# Patient Record
Sex: FEMALE | Race: WHITE | Employment: FULL TIME | URBAN - METROPOLITAN AREA
[De-identification: names, ages, dates, MRNs, and addresses within clinical notes are randomized per-mention and may not be internally consistent; named-entity substitution may affect disease eponyms.]

---

## 2017-01-04 ENCOUNTER — OFFICE VISIT (OUTPATIENT)
Dept: FAMILY MEDICINE CLINIC | Age: 32
End: 2017-01-04

## 2017-01-04 VITALS
RESPIRATION RATE: 12 BRPM | SYSTOLIC BLOOD PRESSURE: 112 MMHG | TEMPERATURE: 98.5 F | HEART RATE: 104 BPM | WEIGHT: 166.4 LBS | BODY MASS INDEX: 25.22 KG/M2 | DIASTOLIC BLOOD PRESSURE: 80 MMHG

## 2017-01-04 DIAGNOSIS — F41.8 DEPRESSION WITH ANXIETY: ICD-10-CM

## 2017-01-04 DIAGNOSIS — F33.1 DEPRESSION, MAJOR, RECURRENT, MODERATE (HCC): Primary | ICD-10-CM

## 2017-01-04 PROCEDURE — 99214 OFFICE O/P EST MOD 30 MIN: CPT | Performed by: FAMILY MEDICINE

## 2017-01-04 RX ORDER — VILAZODONE HYDROCHLORIDE 20 MG/1
20 TABLET ORAL DAILY
Qty: 30 TABLET | Refills: 5 | Status: SHIPPED | OUTPATIENT
Start: 2017-01-04 | End: 2017-01-19 | Stop reason: SDUPTHER

## 2017-01-04 RX ORDER — ZOLPIDEM TARTRATE 10 MG/1
5-10 TABLET ORAL NIGHTLY PRN
Qty: 15 TABLET | Refills: 0 | Status: SHIPPED | OUTPATIENT
Start: 2017-01-04 | End: 2017-02-03

## 2017-01-10 ENCOUNTER — PATIENT MESSAGE (OUTPATIENT)
Dept: FAMILY MEDICINE CLINIC | Age: 32
End: 2017-01-10

## 2017-01-10 DIAGNOSIS — F33.1 DEPRESSION, MAJOR, RECURRENT, MODERATE (HCC): ICD-10-CM

## 2017-01-10 DIAGNOSIS — F41.8 DEPRESSION WITH ANXIETY: ICD-10-CM

## 2017-01-19 RX ORDER — VILAZODONE HYDROCHLORIDE 40 MG/1
40 TABLET ORAL DAILY
Qty: 30 TABLET | Refills: 2 | Status: SHIPPED | OUTPATIENT
Start: 2017-01-19 | End: 2017-04-05 | Stop reason: SDUPTHER

## 2017-04-05 ENCOUNTER — OFFICE VISIT (OUTPATIENT)
Dept: FAMILY MEDICINE CLINIC | Age: 32
End: 2017-04-05

## 2017-04-05 VITALS
TEMPERATURE: 98.5 F | RESPIRATION RATE: 12 BRPM | SYSTOLIC BLOOD PRESSURE: 127 MMHG | HEART RATE: 120 BPM | OXYGEN SATURATION: 97 % | WEIGHT: 168.6 LBS | DIASTOLIC BLOOD PRESSURE: 77 MMHG | BODY MASS INDEX: 25.55 KG/M2

## 2017-04-05 DIAGNOSIS — F41.8 DEPRESSION WITH ANXIETY: ICD-10-CM

## 2017-04-05 DIAGNOSIS — F33.1 DEPRESSION, MAJOR, RECURRENT, MODERATE (HCC): ICD-10-CM

## 2017-04-05 DIAGNOSIS — R35.0 URINARY FREQUENCY: Primary | ICD-10-CM

## 2017-04-05 DIAGNOSIS — S69.92XA FINGER INJURY, LEFT, INITIAL ENCOUNTER: ICD-10-CM

## 2017-04-05 PROCEDURE — 99214 OFFICE O/P EST MOD 30 MIN: CPT | Performed by: FAMILY MEDICINE

## 2017-04-05 RX ORDER — ZOLPIDEM TARTRATE 10 MG/1
TABLET ORAL NIGHTLY PRN
Status: CANCELLED | OUTPATIENT
Start: 2017-04-05

## 2017-04-05 RX ORDER — VILAZODONE HYDROCHLORIDE 40 MG/1
40 TABLET ORAL DAILY
Qty: 90 TABLET | Refills: 1 | Status: SHIPPED | OUTPATIENT
Start: 2017-04-05 | End: 2017-04-05 | Stop reason: SDUPTHER

## 2017-04-05 RX ORDER — VILAZODONE HYDROCHLORIDE 40 MG/1
40 TABLET ORAL DAILY
Qty: 30 TABLET | Refills: 0 | OUTPATIENT
Start: 2017-04-05

## 2017-04-05 RX ORDER — VILAZODONE HYDROCHLORIDE 40 MG/1
40 TABLET ORAL DAILY
Qty: 90 TABLET | Refills: 1 | Status: CANCELLED | OUTPATIENT
Start: 2017-04-05

## 2017-04-05 RX ORDER — VILAZODONE HYDROCHLORIDE 40 MG/1
40 TABLET ORAL DAILY
Qty: 90 TABLET | Refills: 1 | Status: SHIPPED | OUTPATIENT
Start: 2017-04-05 | End: 2017-09-08

## 2017-04-05 RX ORDER — ZOLPIDEM TARTRATE 10 MG/1
TABLET ORAL NIGHTLY PRN
COMMUNITY
End: 2017-04-05 | Stop reason: SDUPTHER

## 2017-04-05 RX ORDER — ZOLPIDEM TARTRATE 10 MG/1
5-10 TABLET ORAL NIGHTLY PRN
Qty: 30 TABLET | Refills: 1 | Status: SHIPPED | OUTPATIENT
Start: 2017-04-05 | End: 2017-12-08 | Stop reason: SDUPTHER

## 2017-04-06 ENCOUNTER — OFFICE VISIT (OUTPATIENT)
Dept: ORTHOPEDIC SURGERY | Age: 32
End: 2017-04-06

## 2017-04-06 VITALS
HEART RATE: 76 BPM | HEIGHT: 69 IN | SYSTOLIC BLOOD PRESSURE: 140 MMHG | DIASTOLIC BLOOD PRESSURE: 88 MMHG | RESPIRATION RATE: 16 BRPM | BODY MASS INDEX: 24.44 KG/M2 | WEIGHT: 165 LBS

## 2017-04-06 DIAGNOSIS — S60.152A CONTUSION OF LEFT LITTLE FINGER WITH DAMAGE TO NAIL, INITIAL ENCOUNTER: Primary | ICD-10-CM

## 2017-04-06 PROCEDURE — 99243 OFF/OP CNSLTJ NEW/EST LOW 30: CPT | Performed by: ORTHOPAEDIC SURGERY

## 2017-04-06 PROCEDURE — 73140 X-RAY EXAM OF FINGER(S): CPT | Performed by: ORTHOPAEDIC SURGERY

## 2017-05-17 ENCOUNTER — OFFICE VISIT (OUTPATIENT)
Dept: PSYCHIATRY | Age: 32
End: 2017-05-17

## 2017-05-17 VITALS
HEART RATE: 82 BPM | HEIGHT: 69 IN | BODY MASS INDEX: 24.94 KG/M2 | WEIGHT: 168.4 LBS | SYSTOLIC BLOOD PRESSURE: 102 MMHG | DIASTOLIC BLOOD PRESSURE: 64 MMHG

## 2017-05-17 DIAGNOSIS — F51.01 PRIMARY INSOMNIA: ICD-10-CM

## 2017-05-17 DIAGNOSIS — F33.41 RECURRENT MAJOR DEPRESSIVE DISORDER, IN PARTIAL REMISSION (HCC): Primary | ICD-10-CM

## 2017-05-17 DIAGNOSIS — Z86.59 H/O BULIMIA NERVOSA: ICD-10-CM

## 2017-05-17 DIAGNOSIS — Z86.59 H/O ANOREXIA NERVOSA: ICD-10-CM

## 2017-05-17 DIAGNOSIS — F41.1 GAD (GENERALIZED ANXIETY DISORDER): ICD-10-CM

## 2017-05-17 PROCEDURE — 99204 OFFICE O/P NEW MOD 45 MIN: CPT | Performed by: PSYCHIATRY & NEUROLOGY

## 2017-09-08 ENCOUNTER — OFFICE VISIT (OUTPATIENT)
Dept: PSYCHIATRY | Age: 32
End: 2017-09-08

## 2017-09-08 VITALS
DIASTOLIC BLOOD PRESSURE: 80 MMHG | HEART RATE: 86 BPM | SYSTOLIC BLOOD PRESSURE: 110 MMHG | WEIGHT: 166 LBS | HEIGHT: 69 IN | BODY MASS INDEX: 24.59 KG/M2

## 2017-09-08 DIAGNOSIS — Z86.59 H/O ANOREXIA NERVOSA: ICD-10-CM

## 2017-09-08 DIAGNOSIS — F41.1 GAD (GENERALIZED ANXIETY DISORDER): ICD-10-CM

## 2017-09-08 DIAGNOSIS — Z86.59 H/O BULIMIA NERVOSA: ICD-10-CM

## 2017-09-08 DIAGNOSIS — F33.41 RECURRENT MAJOR DEPRESSIVE DISORDER, IN PARTIAL REMISSION (HCC): Primary | ICD-10-CM

## 2017-09-08 PROCEDURE — 99214 OFFICE O/P EST MOD 30 MIN: CPT | Performed by: PSYCHIATRY & NEUROLOGY

## 2017-09-08 RX ORDER — HYDROCORTISONE 5 MG/1
5 TABLET ORAL 2 TIMES DAILY
COMMUNITY
End: 2018-03-21 | Stop reason: ALTCHOICE

## 2017-09-08 RX ORDER — BUPROPION HYDROCHLORIDE 150 MG/1
150 TABLET ORAL EVERY MORNING
Qty: 7 TABLET | Refills: 0 | Status: SHIPPED | OUTPATIENT
Start: 2017-09-08 | End: 2017-10-04

## 2017-09-08 RX ORDER — BUPROPION HYDROCHLORIDE 150 MG/1
150 TABLET ORAL EVERY MORNING
Qty: 30 TABLET | Refills: 1 | Status: SHIPPED | OUTPATIENT
Start: 2017-09-08 | End: 2017-10-04

## 2017-10-04 ENCOUNTER — OFFICE VISIT (OUTPATIENT)
Dept: PSYCHIATRY | Age: 32
End: 2017-10-04

## 2017-10-04 VITALS
BODY MASS INDEX: 24.14 KG/M2 | DIASTOLIC BLOOD PRESSURE: 78 MMHG | OXYGEN SATURATION: 100 % | HEART RATE: 92 BPM | WEIGHT: 163 LBS | HEIGHT: 69 IN | SYSTOLIC BLOOD PRESSURE: 110 MMHG

## 2017-10-04 DIAGNOSIS — F33.41 RECURRENT MAJOR DEPRESSIVE DISORDER, IN PARTIAL REMISSION (HCC): Primary | ICD-10-CM

## 2017-10-04 DIAGNOSIS — Z86.59 H/O ANOREXIA NERVOSA: ICD-10-CM

## 2017-10-04 DIAGNOSIS — F41.1 GAD (GENERALIZED ANXIETY DISORDER): ICD-10-CM

## 2017-10-04 DIAGNOSIS — F51.01 PRIMARY INSOMNIA: ICD-10-CM

## 2017-10-04 DIAGNOSIS — Z86.59 H/O BULIMIA NERVOSA: ICD-10-CM

## 2017-10-04 PROCEDURE — 99214 OFFICE O/P EST MOD 30 MIN: CPT | Performed by: PSYCHIATRY & NEUROLOGY

## 2017-10-04 RX ORDER — PRAZOSIN HYDROCHLORIDE 1 MG/1
1 CAPSULE ORAL NIGHTLY
Qty: 7 CAPSULE | Refills: 0 | Status: SHIPPED | OUTPATIENT
Start: 2017-10-04 | End: 2018-03-21 | Stop reason: ALTCHOICE

## 2017-10-04 RX ORDER — BUPROPION HYDROCHLORIDE 300 MG/1
300 TABLET ORAL EVERY MORNING
Qty: 30 TABLET | Refills: 1 | Status: SHIPPED | OUTPATIENT
Start: 2017-10-04 | End: 2017-12-08 | Stop reason: SDUPTHER

## 2017-10-04 RX ORDER — PRAZOSIN HYDROCHLORIDE 1 MG/1
1 CAPSULE ORAL NIGHTLY
Qty: 30 CAPSULE | Refills: 1 | Status: SHIPPED | OUTPATIENT
Start: 2017-10-04 | End: 2018-03-21 | Stop reason: ALTCHOICE

## 2017-10-09 ENCOUNTER — OFFICE VISIT (OUTPATIENT)
Dept: PSYCHOLOGY | Age: 32
End: 2017-10-09

## 2017-10-09 DIAGNOSIS — F33.1 MAJOR DEPRESSIVE DISORDER, RECURRENT EPISODE, MODERATE (HCC): Primary | ICD-10-CM

## 2017-10-09 DIAGNOSIS — F41.1 GAD (GENERALIZED ANXIETY DISORDER): ICD-10-CM

## 2017-10-09 PROCEDURE — 90832 PSYTX W PT 30 MINUTES: CPT | Performed by: PSYCHOLOGIST

## 2017-10-09 ASSESSMENT — PATIENT HEALTH QUESTIONNAIRE - PHQ9
7. TROUBLE CONCENTRATING ON THINGS, SUCH AS READING THE NEWSPAPER OR WATCHING TELEVISION: 3
2. FEELING DOWN, DEPRESSED OR HOPELESS: 3
6. FEELING BAD ABOUT YOURSELF - OR THAT YOU ARE A FAILURE OR HAVE LET YOURSELF OR YOUR FAMILY DOWN: 3
8. MOVING OR SPEAKING SO SLOWLY THAT OTHER PEOPLE COULD HAVE NOTICED. OR THE OPPOSITE, BEING SO FIGETY OR RESTLESS THAT YOU HAVE BEEN MOVING AROUND A LOT MORE THAN USUAL: 2
3. TROUBLE FALLING OR STAYING ASLEEP: 3
SUM OF ALL RESPONSES TO PHQ9 QUESTIONS 1 & 2: 6
4. FEELING TIRED OR HAVING LITTLE ENERGY: 3
5. POOR APPETITE OR OVEREATING: 3
SUM OF ALL RESPONSES TO PHQ QUESTIONS 1-9: 23
1. LITTLE INTEREST OR PLEASURE IN DOING THINGS: 3
9. THOUGHTS THAT YOU WOULD BE BETTER OFF DEAD, OR OF HURTING YOURSELF: 0

## 2017-10-09 ASSESSMENT — ANXIETY QUESTIONNAIRES
3. WORRYING TOO MUCH ABOUT DIFFERENT THINGS: 3-NEARLY EVERY DAY
6. BECOMING EASILY ANNOYED OR IRRITABLE: 3-NEARLY EVERY DAY
GAD7 TOTAL SCORE: 18
7. FEELING AFRAID AS IF SOMETHING AWFUL MIGHT HAPPEN: 2-OVER HALF THE DAYS
5. BEING SO RESTLESS THAT IT IS HARD TO SIT STILL: 1-SEVERAL DAYS
1. FEELING NERVOUS, ANXIOUS, OR ON EDGE: 3-NEARLY EVERY DAY
4. TROUBLE RELAXING: 3-NEARLY EVERY DAY
2. NOT BEING ABLE TO STOP OR CONTROL WORRYING: 3-NEARLY EVERY DAY

## 2017-10-09 NOTE — PATIENT INSTRUCTIONS
Goals: 1. Continue going to the gym to do cardio and weights 5 days per week  2. Block 11-11:30am on your calendar every work day. During this time, leave the building. Use this time to eat, breathe, meditate, walk, etc. This is YOUR time. 3. Eat three times per day. Eat something in the morning (e.g., piece of fruit). 4. Find time to socialize at least 2 times per week  5. Try walking with your running group twice per week. The goal is to socialize! 6. When you wake up in the middle of the night, practice diaphragmatic breathing, progressive muscle relaxation, meditation, etc, first. If you can't get back to sleep after 20 minutes, get out of bed and do something calming and quiet (e.g., read, listen to soft music). 7. Work on challenging your automatic negative thoughts about your weight and self-image.

## 2017-10-09 NOTE — PROGRESS NOTES
Behavioral Health Consultation  Singh Hilario Psy.D. Psychologist  10/9/2017  4:59 PM      Time spent with Patient: 30 minutes  This is patient's second [facility] appointment. Reason for Consult:  depression, anxiety and stress  Referring Provider: MD Sabrina Jaeger 150  29 [facility], [address] [place] [address]  Psychiatric Consultant: Laura Hernandez MD      S:  Pt reported that she's been \"really bad. 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 Feels she was lied to before she took this position. 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 Discussed pattern of relationship issues. Had been seeing therapist at [facility] but stopped going. Exercising 5 days/week, trying to do meal prep. Broke up with her boyfriend because she didn't feel she had enough time to spend with him. Isolating herself more socially. Testing has shown her cortisol level to be really low, still has more testing to undergo. Tired all the time. Started taking Wellbutrin but hasn't noticed improvement.        O:  MSE:  Appearance: good hygiene and appropriate attire  Attitude: cooperative and moderate distress, tearful  Consciousness: alert  Orientation: oriented to person, place, time, general circumstance  Memory: recent and remote memory intact  Attention/Concentration: intact during session  Psychomotor Activity: normal  Eye Contact: normal  Speech: normal rate and volume, well-articulated  Mood: dyshporic and anxious  Affect: normal, congruent with thought content and mood  Perception: within normal limits  Thought Content: within normal limits   Thought Process: logical, goal-directed, coherent  Insight: good  Judgment:

## 2017-11-09 ENCOUNTER — OFFICE VISIT (OUTPATIENT)
Dept: PSYCHOLOGY | Age: 32
End: 2017-11-09

## 2017-11-09 DIAGNOSIS — F33.1 MAJOR DEPRESSIVE DISORDER, RECURRENT EPISODE, MODERATE (HCC): Primary | ICD-10-CM

## 2017-11-09 DIAGNOSIS — G47.00 INSOMNIA, UNSPECIFIED TYPE: ICD-10-CM

## 2017-11-09 DIAGNOSIS — F41.1 GENERALIZED ANXIETY DISORDER: ICD-10-CM

## 2017-11-09 PROCEDURE — 90832 PSYTX W PT 30 MINUTES: CPT | Performed by: PSYCHOLOGIST

## 2017-11-09 ASSESSMENT — PATIENT HEALTH QUESTIONNAIRE - PHQ9
4. FEELING TIRED OR HAVING LITTLE ENERGY: 3
6. FEELING BAD ABOUT YOURSELF - OR THAT YOU ARE A FAILURE OR HAVE LET YOURSELF OR YOUR FAMILY DOWN: 3
SUM OF ALL RESPONSES TO PHQ QUESTIONS 1-9: 19
2. FEELING DOWN, DEPRESSED OR HOPELESS: 2
3. TROUBLE FALLING OR STAYING ASLEEP: 3
SUM OF ALL RESPONSES TO PHQ9 QUESTIONS 1 & 2: 3
9. THOUGHTS THAT YOU WOULD BE BETTER OFF DEAD, OR OF HURTING YOURSELF: 0
7. TROUBLE CONCENTRATING ON THINGS, SUCH AS READING THE NEWSPAPER OR WATCHING TELEVISION: 3
1. LITTLE INTEREST OR PLEASURE IN DOING THINGS: 1
5. POOR APPETITE OR OVEREATING: 3
8. MOVING OR SPEAKING SO SLOWLY THAT OTHER PEOPLE COULD HAVE NOTICED. OR THE OPPOSITE, BEING SO FIGETY OR RESTLESS THAT YOU HAVE BEEN MOVING AROUND A LOT MORE THAN USUAL: 1

## 2017-11-09 ASSESSMENT — ANXIETY QUESTIONNAIRES
6. BECOMING EASILY ANNOYED OR IRRITABLE: 3-NEARLY EVERY DAY
3. WORRYING TOO MUCH ABOUT DIFFERENT THINGS: 2-OVER HALF THE DAYS
7. FEELING AFRAID AS IF SOMETHING AWFUL MIGHT HAPPEN: 2-OVER HALF THE DAYS
1. FEELING NERVOUS, ANXIOUS, OR ON EDGE: 2-OVER HALF THE DAYS
4. TROUBLE RELAXING: 3-NEARLY EVERY DAY
GAD7 TOTAL SCORE: 16
5. BEING SO RESTLESS THAT IT IS HARD TO SIT STILL: 2-OVER HALF THE DAYS
2. NOT BEING ABLE TO STOP OR CONTROL WORRYING: 2-OVER HALF THE DAYS
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
## 2017-12-07 ENCOUNTER — OFFICE VISIT (OUTPATIENT)
Dept: PSYCHOLOGY | Age: 32
End: 2017-12-07

## 2017-12-07 DIAGNOSIS — F33.1 MAJOR DEPRESSIVE DISORDER, RECURRENT EPISODE, MODERATE (HCC): Primary | ICD-10-CM

## 2017-12-07 DIAGNOSIS — F41.1 GENERALIZED ANXIETY DISORDER: ICD-10-CM

## 2017-12-07 PROCEDURE — 90832 PSYTX W PT 30 MINUTES: CPT | Performed by: PSYCHOLOGIST

## 2017-12-07 ASSESSMENT — PATIENT HEALTH QUESTIONNAIRE - PHQ9
3. TROUBLE FALLING OR STAYING ASLEEP: 3
1. LITTLE INTEREST OR PLEASURE IN DOING THINGS: 3
5. POOR APPETITE OR OVEREATING: 1
SUM OF ALL RESPONSES TO PHQ QUESTIONS 1-9: 19
8. MOVING OR SPEAKING SO SLOWLY THAT OTHER PEOPLE COULD HAVE NOTICED. OR THE OPPOSITE, BEING SO FIGETY OR RESTLESS THAT YOU HAVE BEEN MOVING AROUND A LOT MORE THAN USUAL: 1
2. FEELING DOWN, DEPRESSED OR HOPELESS: 3
6. FEELING BAD ABOUT YOURSELF - OR THAT YOU ARE A FAILURE OR HAVE LET YOURSELF OR YOUR FAMILY DOWN: 3
4. FEELING TIRED OR HAVING LITTLE ENERGY: 3
SUM OF ALL RESPONSES TO PHQ9 QUESTIONS 1 & 2: 6
7. TROUBLE CONCENTRATING ON THINGS, SUCH AS READING THE NEWSPAPER OR WATCHING TELEVISION: 2
9. THOUGHTS THAT YOU WOULD BE BETTER OFF DEAD, OR OF HURTING YOURSELF: 0

## 2017-12-07 ASSESSMENT — ANXIETY QUESTIONNAIRES
7. FEELING AFRAID AS IF SOMETHING AWFUL MIGHT HAPPEN: 1-SEVERAL DAYS
5. BEING SO RESTLESS THAT IT IS HARD TO SIT STILL: 0-NOT AT ALL SURE
2. NOT BEING ABLE TO STOP OR CONTROL WORRYING: 0-NOT AT ALL SURE
1. FEELING NERVOUS, ANXIOUS, OR ON EDGE: 1-SEVERAL DAYS
3. WORRYING TOO MUCH ABOUT DIFFERENT THINGS: 1-SEVERAL DAYS
4. TROUBLE RELAXING: 1-SEVERAL DAYS
6. BECOMING EASILY ANNOYED OR IRRITABLE: 2-OVER HALF THE DAYS
GAD7 TOTAL SCORE: 6

## 2017-12-07 NOTE — PATIENT INSTRUCTIONS
Goals: 1. Focus on recognizing your patterns and work on boundary-setting and self-care  2. Consider gratitude journaling before bed      [facility]  Locations in [facility], [facility], [place] [place], [place] free number: [phone]  [email or web address]

## 2017-12-08 ENCOUNTER — OFFICE VISIT (OUTPATIENT)
Dept: FAMILY MEDICINE CLINIC | Age: 32
End: 2017-12-08

## 2017-12-08 ENCOUNTER — TELEPHONE (OUTPATIENT)
Dept: FAMILY MEDICINE CLINIC | Age: 32
End: 2017-12-08

## 2017-12-08 VITALS
RESPIRATION RATE: 12 BRPM | BODY MASS INDEX: 25.74 KG/M2 | TEMPERATURE: 97.7 F | HEIGHT: 67 IN | WEIGHT: 164 LBS | HEART RATE: 90 BPM | SYSTOLIC BLOOD PRESSURE: 119 MMHG | DIASTOLIC BLOOD PRESSURE: 84 MMHG

## 2017-12-08 DIAGNOSIS — F51.5 NIGHTMARES: ICD-10-CM

## 2017-12-08 DIAGNOSIS — G43.109 MIGRAINE WITH AURA AND WITHOUT STATUS MIGRAINOSUS, NOT INTRACTABLE: Primary | ICD-10-CM

## 2017-12-08 DIAGNOSIS — G43.109 MIGRAINE WITH AURA AND WITHOUT STATUS MIGRAINOSUS, NOT INTRACTABLE: ICD-10-CM

## 2017-12-08 DIAGNOSIS — F33.1 DEPRESSION, MAJOR, RECURRENT, MODERATE (HCC): ICD-10-CM

## 2017-12-08 PROCEDURE — 99214 OFFICE O/P EST MOD 30 MIN: CPT | Performed by: FAMILY MEDICINE

## 2017-12-08 RX ORDER — ONDANSETRON 4 MG/1
4 TABLET, ORALLY DISINTEGRATING ORAL EVERY 8 HOURS PRN
Qty: 15 TABLET | Refills: 2 | Status: SHIPPED | OUTPATIENT
Start: 2017-12-08 | End: 2017-12-08 | Stop reason: SDUPTHER

## 2017-12-08 RX ORDER — ONDANSETRON 4 MG/1
4 TABLET, ORALLY DISINTEGRATING ORAL EVERY 8 HOURS PRN
Qty: 15 TABLET | Refills: 2 | Status: SHIPPED | OUTPATIENT
Start: 2017-12-08 | End: 2018-03-21 | Stop reason: ALTCHOICE

## 2017-12-08 RX ORDER — ZOLPIDEM TARTRATE 10 MG/1
5-10 TABLET ORAL NIGHTLY PRN
Qty: 30 TABLET | Refills: 1 | Status: SHIPPED | OUTPATIENT
Start: 2017-12-08 | End: 2018-01-04 | Stop reason: SDUPTHER

## 2017-12-08 RX ORDER — BUPROPION HYDROCHLORIDE 300 MG/1
300 TABLET ORAL EVERY MORNING
Qty: 30 TABLET | Refills: 5 | Status: SHIPPED | OUTPATIENT
Start: 2017-12-08 | End: 2019-02-14 | Stop reason: SDUPTHER

## 2017-12-08 RX ORDER — RIZATRIPTAN BENZOATE 10 MG/1
TABLET ORAL
Qty: 9 TABLET | Refills: 5 | Status: SHIPPED | OUTPATIENT
Start: 2017-12-08 | End: 2018-03-21 | Stop reason: ALTCHOICE

## 2017-12-08 NOTE — TELEPHONE ENCOUNTER
Pt aware she requested letter be faxed to the [facility] study ( authorization is in chart) faxed to [phone]

## 2017-12-08 NOTE — PROGRESS NOTES
Subjective:      Patient ID: Jenn Jacobs is a 28 y.o. female. HPI  Silvia Osorio is here for evaluation for migraines. Increase in HAs x one month. In the past rarely had severe migraines. The past month has severe migraines 2 to 3 times a week. Associated with nausea and vomiting. + photophobia and phonophobia. Being in a car can trigger migraines. HAs are frontal and posterior. Bilateral.  Pound, ache. Is eating well. Drinking plenty of fluids. Not menstrual.   + trigger   Has had HAs like this in the past but never this frequently. Divina + sleep helps. + stress - had to move into her mom's because her apartment building was sold. Has difficulty sleeping. Has nightmares that are worse since move.  + depressed. Wellbutrin helps but is not adequate. Is enrolled to start a clinical trial for depression next week. It is an injectable Ketamine derivative. (repastinal). Will stay on Wellbutrin in the trial.    Has Prazosin at home but never tried it. Is going to find another therapist since Dr. Shelli Almonte is going on maternity leave. Is taking Ambien 1/2 every other night. Is effective and well tolerated.      Outpatient Prescriptions Marked as Taking for the 12/8/17 encounter (Office Visit) with Katherine De La Cruz MD   Medication Sig Dispense Refill    buPROPion (WELLBUTRIN XL) 300 MG extended release tablet Take 1 tablet by mouth every morning 30 tablet 1    zolpidem (AMBIEN) 10 MG tablet Take 0.5-1 tablets by mouth nightly as needed for Sleep 30 tablet 1    etonogestrel-ethinyl estradiol (NUVARING) 0.12-0.015 MG/24HR vaginal ring Place 1 each vaginally See Admin Instructions        Patient Active Problem List   Diagnosis    Elevated AST (SGOT)    Allergic rhinitis    Eczema    Insulin resistance    Recurrent major depressive disorder, in partial remission (HCC)    Psychophysiological insomnia    Thoracic myofascial strain    Iliopsoas bursitis    Contusion of left little finger with damage to nail    JEAN (generalized anxiety disorder)    H/O anorexia nervosa    H/O bulimia nervosa    Primary insomnia    b  Outpatient Prescriptions Marked as Taking for the 12/8/17 encounter (Office Visit) with Danny Peter MD   Medication Sig Dispense Refill    zolpidem (AMBIEN) 10 MG tablet Take 0.5-1 tablets by mouth nightly as needed for Sleep . 30 tablet 1    buPROPion (WELLBUTRIN XL) 300 MG extended release tablet Take 1 tablet by mouth every morning 30 tablet 5                  etonogestrel-ethinyl estradiol (NUVARING) 0.12-0.015 MG/24HR vaginal ring Place 1 each vaginally See Admin Instructions       PMH, PSH, SH, Problem list reviewed. Social History   Substance Use Topics    Smoking status: Never Smoker    Smokeless tobacco: Never Used    Alcohol use Yes      Comment: occ      Allergies   Allergen Reactions    Latex Rash    Vicodin [Hydrocodone-Acetaminophen] Itching and Rash      Review of Systems    Objective:   Physical Exam  NAD  Skin turgor normal, no rashes. Ear exam - bilateral normal, TM intact without perforation or effusion, external canal normal. No significant ceruminosis noted. Nasal exam; septum midline, no deformities, nares patent, normal mucosa without swelling, no polyps, no bleeding. Pharynx normal, no oral lesions, dentition in good repair. The neck is supple and free of adenopathy or masses, the thyroid is normal without enlargement or nodules. Chest is clear, no wheezing or rales. Normal symmetric air entry throughout both lung fields. Heart regular with normal rate, no murmer or gallop. PERRLA  EOMI  CN intact. Motor 5/5 abduction shoulder and flexion at hip. DTR 2/4 patella. Gait is normal.   Mood +, affect is normal, speech appropriate, no agitation or psychomotor retardation. Assessment:      1.  Migraine with aura and without status migrainosus, not intractable  ondansetron (ZOFRAN ODT) 4 MG disintegrating tablet    rizatriptan (MAXALT) 10 MG

## 2018-01-04 ENCOUNTER — OFFICE VISIT (OUTPATIENT)
Dept: FAMILY MEDICINE CLINIC | Age: 33
End: 2018-01-04

## 2018-01-04 VITALS
DIASTOLIC BLOOD PRESSURE: 84 MMHG | OXYGEN SATURATION: 98 % | RESPIRATION RATE: 16 BRPM | HEART RATE: 99 BPM | SYSTOLIC BLOOD PRESSURE: 126 MMHG

## 2018-01-04 DIAGNOSIS — G47.9 SLEEPING DIFFICULTIES: ICD-10-CM

## 2018-01-04 DIAGNOSIS — G43.909 MIGRAINE WITHOUT STATUS MIGRAINOSUS, NOT INTRACTABLE, UNSPECIFIED MIGRAINE TYPE: ICD-10-CM

## 2018-01-04 DIAGNOSIS — R03.0 ELEVATED BLOOD PRESSURE READING: Primary | ICD-10-CM

## 2018-01-04 DIAGNOSIS — F33.1 DEPRESSION, MAJOR, RECURRENT, MODERATE (HCC): ICD-10-CM

## 2018-01-04 PROCEDURE — 99214 OFFICE O/P EST MOD 30 MIN: CPT | Performed by: FAMILY MEDICINE

## 2018-01-04 RX ORDER — ZOLPIDEM TARTRATE 10 MG/1
5-10 TABLET ORAL NIGHTLY PRN
Qty: 30 TABLET | Refills: 1 | Status: SHIPPED | OUTPATIENT
Start: 2018-01-04 | End: 2018-02-03

## 2018-01-04 RX ORDER — TOPIRAMATE 50 MG/1
50 TABLET, FILM COATED ORAL 2 TIMES DAILY
Qty: 60 TABLET | Refills: 1 | Status: SHIPPED | OUTPATIENT
Start: 2018-01-04 | End: 2018-09-12 | Stop reason: SDUPTHER

## 2018-01-04 NOTE — PROGRESS NOTES
Patient is here for headaches and elevated blood pressure . Her blood pressure yesterday was 130/110. Denies decongestants , caffeine . She drinks 3 alcohol per week but 1 per day - wine with dinner. No visual problems. Her headaches started more frequent 2 months ago . 2-3 times per week. Some  flushing. Normal menses monthly. Some nausea and photophobia with headache . Nausea usually comes with headaches and emesis occurs once per month. Not sure if her headaches are around menses. H/o car sickness and gets emesis at times with travelling in car. She does better with driving usually,but can still get sick. Sleeping fluctuates. She is able to stay asleep,but struggles to fall asleep. Average is 6 hours per night. It can take 30 minutes -4 hours to fall asleep. She accidentally threw Ambien away. She admits to work stress. 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 She gets upset with \"messed up stuff\". She went up on the Wellbutrin  mg qd 2-3 months ago with Dr. Rhiannon Friedman. Her blood pressure was 110/80  In 9/17 prior to start of Wellbutrin  mg .  110/78 in 10/17 prior to increase in Wellbutrin. Still exercising 5-6 d/ week and has been consistent the past 10 + years. Her blood pressure has been a bit higher 120s/80s . Her blood pressure has been higher the past 1 week. She restarted school on 1/2/18. She gets migraines previously 2x/ year ,but now 2 times per week. Her headache is cheek / sinus and posterior. Her headache is 5/10 now. excedrin migraine is taken ,but not taken regularly , usually 1x/ week or so. ROS: All other systems were reviewed and are negative . Patient's allergies and medications were reviewed. Patient's past medical, surgical, social , and family history were reviewed.       BP Readings from Last 3 Encounters:   01/04/18 (!) 130/90   12/08/17 119/84   10/04/17 110/78     Wt Readings from Last 3 Encounters:   12/08/17 164 lb

## 2018-09-12 DIAGNOSIS — F33.1 DEPRESSION, MAJOR, RECURRENT, MODERATE (HCC): ICD-10-CM

## 2018-09-12 DIAGNOSIS — G47.9 SLEEPING DIFFICULTIES: ICD-10-CM

## 2018-09-12 RX ORDER — TOPIRAMATE 50 MG/1
50 TABLET, FILM COATED ORAL 2 TIMES DAILY
Qty: 60 TABLET | Refills: 5 | Status: SHIPPED | OUTPATIENT
Start: 2018-09-12 | End: 2018-09-18 | Stop reason: SDUPTHER

## 2018-09-12 NOTE — TELEPHONE ENCOUNTER
From: Will New  Sent: 9/12/2018 6:23 AM EDT  Subject: Medication Renewal Request    Marilu Lawrence would like a refill of the following medications:     topiramate (TOPAMAX) 50 MG tablet Luis Lopez MD]    Preferred pharmacy: Other - [facility]

## 2018-09-18 DIAGNOSIS — F33.1 DEPRESSION, MAJOR, RECURRENT, MODERATE (HCC): ICD-10-CM

## 2018-09-18 DIAGNOSIS — G47.9 SLEEPING DIFFICULTIES: ICD-10-CM

## 2018-09-18 RX ORDER — TOPIRAMATE 50 MG/1
50 TABLET, FILM COATED ORAL 2 TIMES DAILY
Qty: 180 TABLET | Refills: 1 | Status: SHIPPED | OUTPATIENT
Start: 2018-09-18 | End: 2018-09-27 | Stop reason: SDUPTHER

## 2018-09-18 NOTE — TELEPHONE ENCOUNTER
[facility] called stating they received the script for topiramate 50 mg, but it was only a one month supply. They need the script sent over again for a 3 month supply. Please advise. Thanks.         [address] [place], [place] [address]valentina [place], [address] [facility] [phone] - F [phone]

## 2018-09-23 ENCOUNTER — APPOINTMENT (OUTPATIENT)
Dept: CT IMAGING | Age: 33
End: 2018-09-23
Payer: COMMERCIAL

## 2018-09-23 ENCOUNTER — HOSPITAL ENCOUNTER (EMERGENCY)
Age: 33
Discharge: HOME | End: 2018-09-23
Attending: EMERGENCY MEDICINE
Payer: COMMERCIAL

## 2018-09-23 ENCOUNTER — APPOINTMENT (OUTPATIENT)
Dept: GENERAL RADIOLOGY | Age: 33
End: 2018-09-23
Payer: COMMERCIAL

## 2018-09-23 VITALS
RESPIRATION RATE: 18 BRPM | HEART RATE: 76 BPM | SYSTOLIC BLOOD PRESSURE: 120 MMHG | DIASTOLIC BLOOD PRESSURE: 71 MMHG | WEIGHT: 153 LBS | TEMPERATURE: 98.3 F | BODY MASS INDEX: 22.66 KG/M2 | HEIGHT: 69 IN | OXYGEN SATURATION: 98 %
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
## 2018-09-23 DIAGNOSIS — S06.0X9A CONCUSSION WITH LOSS OF CONSCIOUSNESS, INITIAL ENCOUNTER: ICD-10-CM

## 2018-09-23 DIAGNOSIS — R07.89 CHEST WALL PAIN: ICD-10-CM

## 2018-09-23 LAB
BACTERIA: ABNORMAL /HPF
BILIRUBIN URINE: NEGATIVE
BLOOD, URINE: ABNORMAL
CLARITY: CLEAR
COLOR: YELLOW
CRYSTALS, UA: ABNORMAL /HPF
EPITHELIAL CELLS, UA: ABNORMAL /HPF
GLUCOSE URINE: NEGATIVE MG/DL
HCG(URINE) PREGNANCY TEST: NEGATIVE
KETONES, URINE: ABNORMAL MG/DL
LEUKOCYTE ESTERASE, URINE: NEGATIVE
MICROSCOPIC EXAMINATION: YES
MUCUS: ABNORMAL /LPF
NITRITE, URINE: NEGATIVE
PH UA: 5.5
PROTEIN UA: ABNORMAL MG/DL
RBC UA: ABNORMAL /HPF (ref 0–2)
SPECIFIC GRAVITY UA: >=1.03
URINE TYPE: ABNORMAL
UROBILINOGEN, URINE: 0.2 E.U./DL
WBC UA: ABNORMAL /HPF (ref 0–5)

## 2018-09-23 PROCEDURE — 6370000000 HC RX 637 (ALT 250 FOR IP): Performed by: PHYSICIAN ASSISTANT

## 2018-09-23 PROCEDURE — 72125 CT NECK SPINE W/O DYE: CPT

## 2018-09-23 PROCEDURE — 70450 CT HEAD/BRAIN W/O DYE: CPT

## 2018-09-23 PROCEDURE — 84703 CHORIONIC GONADOTROPIN ASSAY: CPT

## 2018-09-23 PROCEDURE — 71046 X-RAY EXAM CHEST 2 VIEWS: CPT

## 2018-09-23 PROCEDURE — 81001 URINALYSIS AUTO W/SCOPE: CPT

## 2018-09-23 PROCEDURE — 6360000002 HC RX W HCPCS: Performed by: PHYSICIAN ASSISTANT

## 2018-09-23 PROCEDURE — 70486 CT MAXILLOFACIAL W/O DYE: CPT

## 2018-09-23 PROCEDURE — 99284 EMERGENCY DEPT VISIT MOD MDM: CPT

## 2018-09-23 RX ORDER — METOCLOPRAMIDE 10 MG/1
10 TABLET ORAL ONCE
Status: COMPLETED | OUTPATIENT
Start: 2018-09-23 | End: 2018-09-23

## 2018-09-23 RX ORDER — DIPHENHYDRAMINE HCL 25 MG
50 TABLET ORAL ONCE
Status: COMPLETED | OUTPATIENT
Start: 2018-09-23 | End: 2018-09-23

## 2018-09-23 RX ORDER — DEXAMETHASONE 4 MG/1
10 TABLET ORAL ONCE
Status: COMPLETED | OUTPATIENT
Start: 2018-09-23 | End: 2018-09-23

## 2018-09-23 RX ORDER — HYDROCODONE BITARTRATE AND ACETAMINOPHEN 5; 325 MG/1; MG/1
1 TABLET ORAL EVERY 8 HOURS PRN
Qty: 8 TABLET | Refills: 0 | Status: SHIPPED | OUTPATIENT
Start: 2018-09-23 | End: 2018-09-26

## 2018-09-23 RX ORDER — KETOROLAC TROMETHAMINE 10 MG/1
10 TABLET, FILM COATED ORAL ONCE
Status: COMPLETED | OUTPATIENT
Start: 2018-09-23 | End: 2018-09-23

## 2018-09-23 RX ORDER — METOCLOPRAMIDE HYDROCHLORIDE 5 MG/ML
10 INJECTION INTRAMUSCULAR; INTRAVENOUS ONCE
Status: DISCONTINUED | OUTPATIENT
Start: 2018-09-23 | End: 2018-09-23

## 2018-09-23 RX ADMIN — DIPHENHYDRAMINE HCL 50 MG: 25 TABLET ORAL at 21:03

## 2018-09-23 RX ADMIN — METOCLOPRAMIDE HYDROCHLORIDE 10 MG: 10 TABLET ORAL at 19:29

## 2018-09-23 RX ADMIN — DEXAMETHASONE 10 MG: 4 TABLET ORAL at 19:29

## 2018-09-23 RX ADMIN — KETOROLAC TROMETHAMINE 10 MG: 10 TABLET, FILM COATED ORAL at 21:03

## 2018-09-23 ASSESSMENT — PAIN SCALES - GENERAL
PAINLEVEL_OUTOF10: 7
PAINLEVEL_OUTOF10: 7

## 2018-09-23 ASSESSMENT — PAIN DESCRIPTION - LOCATION
LOCATION: HEAD
LOCATION: HEAD

## 2018-09-23 ASSESSMENT — PAIN DESCRIPTION - PAIN TYPE
TYPE: ACUTE PAIN
TYPE: ACUTE PAIN
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
## 2018-09-27 DIAGNOSIS — F33.1 DEPRESSION, MAJOR, RECURRENT, MODERATE (HCC): ICD-10-CM

## 2018-09-27 DIAGNOSIS — G47.9 SLEEPING DIFFICULTIES: ICD-10-CM

## 2018-09-27 RX ORDER — TOPIRAMATE 50 MG/1
50 TABLET, FILM COATED ORAL 2 TIMES DAILY
Qty: 180 TABLET | Refills: 0 | Status: SHIPPED | OUTPATIENT
Start: 2018-09-27 | End: 2021-08-23

## 2018-10-03 ENCOUNTER — OFFICE VISIT (OUTPATIENT)
Dept: FAMILY MEDICINE CLINIC | Age: 33
End: 2018-10-03
Payer: OTHER MISCELLANEOUS

## 2018-10-03 VITALS
HEART RATE: 84 BPM | RESPIRATION RATE: 8 BRPM | TEMPERATURE: 97.6 F | BODY MASS INDEX: 23.69 KG/M2 | DIASTOLIC BLOOD PRESSURE: 89 MMHG | WEIGHT: 160.4 LBS | SYSTOLIC BLOOD PRESSURE: 128 MMHG | OXYGEN SATURATION: 99 %
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
## 2018-10-03 DIAGNOSIS — S06.0X0A CONCUSSION WITHOUT LOSS OF CONSCIOUSNESS, INITIAL ENCOUNTER: Primary | ICD-10-CM

## 2018-10-03 DIAGNOSIS — H93.13 TINNITUS OF BOTH EARS: ICD-10-CM

## 2018-10-03 DIAGNOSIS — G44.311 INTRACTABLE ACUTE POST-TRAUMATIC HEADACHE: ICD-10-CM

## 2018-10-03 PROCEDURE — 99214 OFFICE O/P EST MOD 30 MIN: CPT | Performed by: FAMILY MEDICINE

## 2018-10-03 RX ORDER — AMITRIPTYLINE HYDROCHLORIDE 10 MG/1
10-20 TABLET, FILM COATED ORAL NIGHTLY PRN
Qty: 60 TABLET | Refills: 2 | Status: SHIPPED | OUTPATIENT
Start: 2018-10-03 | End: 2019-07-03

## 2019-02-14 ENCOUNTER — OFFICE VISIT (OUTPATIENT)
Dept: FAMILY MEDICINE CLINIC | Age: 34
End: 2019-02-14
Payer: COMMERCIAL

## 2019-02-14 VITALS
TEMPERATURE: 98.3 F | SYSTOLIC BLOOD PRESSURE: 121 MMHG | WEIGHT: 166 LBS | BODY MASS INDEX: 24.51 KG/M2 | OXYGEN SATURATION: 97 % | HEART RATE: 93 BPM | DIASTOLIC BLOOD PRESSURE: 80 MMHG

## 2019-02-14 DIAGNOSIS — L30.1 ECZEMA, DYSHIDROTIC: ICD-10-CM

## 2019-02-14 DIAGNOSIS — F33.2 SEVERE EPISODE OF RECURRENT MAJOR DEPRESSIVE DISORDER, WITHOUT PSYCHOTIC FEATURES (HCC): Primary | ICD-10-CM

## 2019-02-14 DIAGNOSIS — Z23 NEED FOR TDAP VACCINATION: ICD-10-CM

## 2019-02-14 DIAGNOSIS — M35.7 HYPERMOBILITY SYNDROME: ICD-10-CM

## 2019-02-14 DIAGNOSIS — G43.009 MIGRAINE WITHOUT AURA AND WITHOUT STATUS MIGRAINOSUS, NOT INTRACTABLE: ICD-10-CM

## 2019-02-14 PROCEDURE — G0444 DEPRESSION SCREEN ANNUAL: HCPCS | Performed by: FAMILY MEDICINE

## 2019-02-14 PROCEDURE — 99214 OFFICE O/P EST MOD 30 MIN: CPT | Performed by: FAMILY MEDICINE

## 2019-02-14 RX ORDER — BUPROPION HYDROCHLORIDE 300 MG/1
300 TABLET ORAL EVERY MORNING
Qty: 30 TABLET | Refills: 5 | Status: SHIPPED | OUTPATIENT
Start: 2019-02-14 | End: 2019-08-07

## 2019-02-14 RX ORDER — RIZATRIPTAN BENZOATE 10 MG/1
10 TABLET, ORALLY DISINTEGRATING ORAL
Qty: 30 TABLET | Refills: 3 | Status: SHIPPED | OUTPATIENT
Start: 2019-02-14 | End: 2020-03-16 | Stop reason: SDUPTHER

## 2019-02-14 RX ORDER — DULOXETIN HYDROCHLORIDE 30 MG/1
30 CAPSULE, DELAYED RELEASE ORAL DAILY
Qty: 30 CAPSULE | Refills: 2 | Status: SHIPPED | OUTPATIENT
Start: 2019-02-14 | End: 2019-03-09 | Stop reason: SDUPTHER

## 2019-02-14 RX ORDER — FLUOCINONIDE 0.5 MG/G
OINTMENT TOPICAL
Qty: 15 G | Refills: 1 | Status: SHIPPED | OUTPATIENT
Start: 2019-02-14 | End: 2019-02-21

## 2019-02-14 ASSESSMENT — PATIENT HEALTH QUESTIONNAIRE - PHQ9
8. MOVING OR SPEAKING SO SLOWLY THAT OTHER PEOPLE COULD HAVE NOTICED. OR THE OPPOSITE, BEING SO FIGETY OR RESTLESS THAT YOU HAVE BEEN MOVING AROUND A LOT MORE THAN USUAL: 0
SUM OF ALL RESPONSES TO PHQ QUESTIONS 1-9: 17
5. POOR APPETITE OR OVEREATING: 0
4. FEELING TIRED OR HAVING LITTLE ENERGY: 3
7. TROUBLE CONCENTRATING ON THINGS, SUCH AS READING THE NEWSPAPER OR WATCHING TELEVISION: 3
9. THOUGHTS THAT YOU WOULD BE BETTER OFF DEAD, OR OF HURTING YOURSELF: 0
10. IF YOU CHECKED OFF ANY PROBLEMS, HOW DIFFICULT HAVE THESE PROBLEMS MADE IT FOR YOU TO DO YOUR WORK, TAKE CARE OF THINGS AT HOME, OR GET ALONG WITH OTHER PEOPLE: 0
6. FEELING BAD ABOUT YOURSELF - OR THAT YOU ARE A FAILURE OR HAVE LET YOURSELF OR YOUR FAMILY DOWN: 3
2. FEELING DOWN, DEPRESSED OR HOPELESS: 2
1. LITTLE INTEREST OR PLEASURE IN DOING THINGS: 3
SUM OF ALL RESPONSES TO PHQ QUESTIONS 1-9: 17
3. TROUBLE FALLING OR STAYING ASLEEP: 3
SUM OF ALL RESPONSES TO PHQ9 QUESTIONS 1 & 2: 5

## 2019-03-09 DIAGNOSIS — F33.2 SEVERE EPISODE OF RECURRENT MAJOR DEPRESSIVE DISORDER, WITHOUT PSYCHOTIC FEATURES (HCC): ICD-10-CM

## 2019-03-09 DIAGNOSIS — M35.7 HYPERMOBILITY SYNDROME: ICD-10-CM

## 2019-03-09 RX ORDER — DULOXETIN HYDROCHLORIDE 60 MG/1
60 CAPSULE, DELAYED RELEASE ORAL DAILY
Qty: 90 CAPSULE | Refills: 1 | Status: SHIPPED | OUTPATIENT
Start: 2019-03-09 | End: 2019-07-03

## 2019-04-22 ENCOUNTER — OFFICE VISIT (OUTPATIENT)
Dept: FAMILY MEDICINE CLINIC | Age: 34
End: 2019-04-22
Payer: COMMERCIAL

## 2019-04-22 VITALS
BODY MASS INDEX: 25.1 KG/M2 | SYSTOLIC BLOOD PRESSURE: 137 MMHG | RESPIRATION RATE: 12 BRPM | HEART RATE: 105 BPM | DIASTOLIC BLOOD PRESSURE: 93 MMHG | TEMPERATURE: 98.2 F | WEIGHT: 170 LBS | OXYGEN SATURATION: 100 %

## 2019-04-22 DIAGNOSIS — G43.011 INTRACTABLE MIGRAINE WITHOUT AURA AND WITH STATUS MIGRAINOSUS: Primary | ICD-10-CM

## 2019-04-22 PROCEDURE — 99213 OFFICE O/P EST LOW 20 MIN: CPT | Performed by: FAMILY MEDICINE

## 2019-04-22 PROCEDURE — 96372 THER/PROPH/DIAG INJ SC/IM: CPT | Performed by: FAMILY MEDICINE

## 2019-04-22 RX ORDER — RIZATRIPTAN BENZOATE 10 MG/1
10 TABLET, ORALLY DISINTEGRATING ORAL
COMMUNITY
End: 2019-04-22 | Stop reason: SDUPTHER

## 2019-04-22 RX ORDER — METHYLPREDNISOLONE 4 MG/1
TABLET ORAL
Qty: 1 KIT | Refills: 0 | Status: SHIPPED | OUTPATIENT
Start: 2019-04-22 | End: 2019-08-07 | Stop reason: ALTCHOICE

## 2019-04-22 RX ORDER — RIZATRIPTAN BENZOATE 10 MG/1
10 TABLET, ORALLY DISINTEGRATING ORAL
Qty: 12 TABLET | Refills: 5 | Status: SHIPPED | OUTPATIENT
Start: 2019-04-22 | End: 2020-06-12 | Stop reason: ALTCHOICE

## 2019-04-22 RX ORDER — KETOROLAC TROMETHAMINE 30 MG/ML
60 INJECTION, SOLUTION INTRAMUSCULAR; INTRAVENOUS ONCE
Status: COMPLETED | OUTPATIENT
Start: 2019-04-22 | End: 2019-04-22

## 2019-04-22 RX ADMIN — KETOROLAC TROMETHAMINE 60 MG: 30 INJECTION, SOLUTION INTRAMUSCULAR; INTRAVENOUS at 12:26
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
## 2019-07-02 ENCOUNTER — OFFICE VISIT (OUTPATIENT)
Dept: FAMILY MEDICINE CLINIC | Age: 34
End: 2019-07-02
Payer: COMMERCIAL

## 2019-07-02 VITALS
TEMPERATURE: 96.9 F | HEART RATE: 77 BPM | DIASTOLIC BLOOD PRESSURE: 86 MMHG | BODY MASS INDEX: 24.96 KG/M2 | OXYGEN SATURATION: 100 % | RESPIRATION RATE: 16 BRPM | SYSTOLIC BLOOD PRESSURE: 142 MMHG | WEIGHT: 169 LBS

## 2019-07-02 DIAGNOSIS — R21 SKIN RASH: ICD-10-CM

## 2019-07-02 DIAGNOSIS — R03.0 ELEVATED BLOOD PRESSURE READING: ICD-10-CM

## 2019-07-02 DIAGNOSIS — M25.551 CHRONIC RIGHT HIP PAIN: ICD-10-CM

## 2019-07-02 DIAGNOSIS — B07.0 PLANTAR WART: Primary | ICD-10-CM

## 2019-07-02 DIAGNOSIS — G89.29 CHRONIC RIGHT HIP PAIN: ICD-10-CM

## 2019-07-02 PROCEDURE — 99214 OFFICE O/P EST MOD 30 MIN: CPT | Performed by: FAMILY MEDICINE

## 2019-07-02 PROCEDURE — 17110 DESTRUCTION B9 LES UP TO 14: CPT | Performed by: FAMILY MEDICINE

## 2019-07-02 RX ORDER — OXYCODONE HYDROCHLORIDE AND ACETAMINOPHEN 5; 325 MG/1; MG/1
1 TABLET ORAL EVERY 6 HOURS PRN
Qty: 25 TABLET | Refills: 0 | Status: SHIPPED | OUTPATIENT
Start: 2019-07-02 | End: 2019-07-09

## 2019-07-02 RX ORDER — VALACYCLOVIR HYDROCHLORIDE 1 G/1
1000 TABLET, FILM COATED ORAL 3 TIMES DAILY
Qty: 21 TABLET | Refills: 0 | Status: SHIPPED | OUTPATIENT
Start: 2019-07-02 | End: 2019-08-07 | Stop reason: ALTCHOICE

## 2019-07-02 NOTE — PROGRESS NOTES
logical connections  Perceptions: denies AH/VH, does not appear preoccupied with the internal environment  Insight    Fair  Judgment    Intact  Orientation    oriented to person, place, time, and general circumstances  Memory    recent and remote memory intact  Attention/Concentration    intact  Ability to understand instructions Yes  Ability to respond meaningfully Yes  SI:   no suicidal ideation  HI: Denies HI    Labs:     Lab Results   Component Value Date     06/09/2016    K 4.4 06/09/2016     06/09/2016    CO2 21 06/09/2016    BUN 13 06/09/2016    CREATININE 0.7 06/09/2016    GLUCOSE 74 06/09/2016    CALCIUM 9.3 06/09/2016      Lab Results   Component Value Date    WBC 4.4 06/09/2016    HGB 13.6 06/09/2016    HCT 40.6 06/09/2016    MCV 92.3 06/09/2016     06/09/2016     Lab Results   Component Value Date    COLORU Yellow 09/23/2018    NITRU Negative 09/23/2018    GLUCOSEU Negative 09/23/2018    KETUA TRACE 09/23/2018    UROBILINOGEN 0.2 09/23/2018    BILIRUBINUR Negative 09/23/2018     Lab Results   Component Value Date    LABA1C 4.8 06/09/2016     Lab Results   Component Value Date    EAG 91.1 06/09/2016     Lab Results   Component Value Date    CHOL 192 09/15/2015     Lab Results   Component Value Date    TRIG 65 09/15/2015     Lab Results   Component Value Date    HDL 80 (A) 09/15/2015     Lab Results   Component Value Date    LDLCALC 99 09/15/2015     Lab Results   Component Value Date    VLDL 13 09/15/2015     Lab Results   Component Value Date    CHOLHDLRATIO 2.4 09/15/2015     Lab Results   Component Value Date    TSH 1.270 09/15/2015     No results found for: KLNYNMP9B8  No results found for: [id number]  No results found for: FOLATE        Imaging: no head imaging on file    Pyatt I  MDD recurrent moderate, JEAN, anorexia nervosa in remission, bulimia nervosa in remission, Insomnia NOS    Axis II: No diagnosis     Axis III       Diagnosis Date    Anxiety     Depression     Neurologic cardiac syncope       Active Problems:    * No active hospital problems. *  Resolved Problems:    * No resolved hospital problems. *       Axis IV  Problems related to the social environment and Occupational problems    ASSESSMENT AND PLAN      1. Safety: NO Imminent risk of danger to/self/others based on the factors considered below. Appropriate for outpatient level of care. Safety plan includes: 911, PES, hotlines, and interventions discussed today. Risk factors: depressed mood, access to gun  Protective factors: Age >25 and <55, female gender, denies suicidal ideation, does not have lethal plan,  patient is daisy for safety, no prior suicide attempts, no family h/o suicide, no substance abuse, patient has social or family support, no active psychosis or cognitive dysfunction, physically healthy, already has outpatient services in place, compliant with recommended medications, and patient is future oriented. 2. Psychiatric:   -Initial assessment 2017: primarily struggling with fatigue/anergia which may be sleep related - results of sleep study and follow up are pending. Could benefit from Wellbutrin but concerned treating the fatigue would just cover up the symptoms and not fix the cause (sleep). Underlying problem is chronic fatigue. We discussed at length that there are many potential causes of this, mental health being only one. We will continue working under the theory that depression is possibly contributing, but she may need to continue pursuing other avenues with her PCP if she sees no improvement.   -Encouraged her to pursue trial of CPAP that sleep medicine offered.   -I have contacted Dr. Glenis Hoang to see when he will start doing Spravato.  Pt was doing well on esketamine IV for 1 year until the trial ended.   -Continue Wellbutrin XL 300mg qAM  -START Prozac 20mg qAM for 2 weeks then increase to 40mg   -Labs: reviewed in Epic, up to date  -Sees private therapist Chaim Navarro

## 2019-07-03 ENCOUNTER — OFFICE VISIT (OUTPATIENT)
Dept: PSYCHIATRY | Age: 34
End: 2019-07-03
Payer: COMMERCIAL

## 2019-07-03 VITALS
HEART RATE: 88 BPM | BODY MASS INDEX: 24.97 KG/M2 | HEIGHT: 69 IN | DIASTOLIC BLOOD PRESSURE: 72 MMHG | SYSTOLIC BLOOD PRESSURE: 130 MMHG | WEIGHT: 168.6 LBS

## 2019-07-03 DIAGNOSIS — Z86.59 H/O BULIMIA NERVOSA: ICD-10-CM

## 2019-07-03 DIAGNOSIS — Z86.59 H/O ANOREXIA NERVOSA: ICD-10-CM

## 2019-07-03 DIAGNOSIS — F33.41 RECURRENT MAJOR DEPRESSIVE DISORDER, IN PARTIAL REMISSION (HCC): Primary | ICD-10-CM

## 2019-07-03 DIAGNOSIS — F41.1 GAD (GENERALIZED ANXIETY DISORDER): ICD-10-CM

## 2019-07-03 PROCEDURE — 99214 OFFICE O/P EST MOD 30 MIN: CPT | Performed by: PSYCHIATRY & NEUROLOGY

## 2019-07-03 RX ORDER — FLUOXETINE HYDROCHLORIDE 20 MG/1
CAPSULE ORAL
Qty: 45 CAPSULE | Refills: 0 | Status: SHIPPED | OUTPATIENT
Start: 2019-07-03 | End: 2019-07-31
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
## 2019-07-31 ENCOUNTER — OFFICE VISIT (OUTPATIENT)
Dept: PSYCHIATRY | Age: 34
End: 2019-07-31
Payer: COMMERCIAL

## 2019-07-31 VITALS
BODY MASS INDEX: 25.34 KG/M2 | HEIGHT: 69 IN | SYSTOLIC BLOOD PRESSURE: 124 MMHG | DIASTOLIC BLOOD PRESSURE: 66 MMHG | WEIGHT: 171.1 LBS | HEART RATE: 76 BPM

## 2019-07-31 DIAGNOSIS — Z86.59 H/O ANOREXIA NERVOSA: ICD-10-CM

## 2019-07-31 DIAGNOSIS — F33.41 RECURRENT MAJOR DEPRESSIVE DISORDER, IN PARTIAL REMISSION (HCC): Primary | ICD-10-CM

## 2019-07-31 DIAGNOSIS — F41.1 GAD (GENERALIZED ANXIETY DISORDER): ICD-10-CM

## 2019-07-31 DIAGNOSIS — Z86.59 H/O BULIMIA NERVOSA: ICD-10-CM

## 2019-07-31 PROCEDURE — 99214 OFFICE O/P EST MOD 30 MIN: CPT | Performed by: PSYCHIATRY & NEUROLOGY

## 2019-07-31 RX ORDER — FLUOXETINE HYDROCHLORIDE 40 MG/1
40 CAPSULE ORAL DAILY
Qty: 30 CAPSULE | Refills: 1 | Status: SHIPPED | OUTPATIENT
Start: 2019-07-31 | End: 2020-04-13

## 2019-08-07 ENCOUNTER — HOSPITAL ENCOUNTER (EMERGENCY)
Age: 34
Discharge: HOME | End: 2019-08-07
Attending: EMERGENCY MEDICINE
Payer: COMMERCIAL

## 2019-08-07 ENCOUNTER — APPOINTMENT (OUTPATIENT)
Dept: CT IMAGING | Age: 34
End: 2019-08-07
Payer: COMMERCIAL

## 2019-08-07 VITALS
RESPIRATION RATE: 16 BRPM | HEART RATE: 93 BPM | OXYGEN SATURATION: 100 % | BODY MASS INDEX: 23.63 KG/M2 | DIASTOLIC BLOOD PRESSURE: 88 MMHG | TEMPERATURE: 97.4 F | SYSTOLIC BLOOD PRESSURE: 126 MMHG | WEIGHT: 160 LBS

## 2019-08-07 DIAGNOSIS — K52.9 COLITIS: Primary | ICD-10-CM

## 2019-08-07 LAB
ALBUMIN SERPL-MCNC: 4.1 G/DL (ref 3.4–5)
ALP BLD-CCNC: 46 U/L (ref 40–129)
ALT SERPL-CCNC: 20 U/L (ref 10–40)
ANION GAP SERPL CALCULATED.3IONS-SCNC: 11 MMOL/L (ref 3–16)
AST SERPL-CCNC: 19 U/L (ref 15–37)
BACTERIA: ABNORMAL /HPF
BASOPHILS ABSOLUTE: 0 K/UL (ref 0–0.2)
BASOPHILS RELATIVE PERCENT: 0.7 %
BILIRUB SERPL-MCNC: 0.5 MG/DL (ref 0–1)
BILIRUBIN DIRECT: <0.2 MG/DL (ref 0–0.3)
BILIRUBIN URINE: ABNORMAL
BILIRUBIN, INDIRECT: NORMAL MG/DL (ref 0–1)
BLOOD, URINE: ABNORMAL
BUN BLDV-MCNC: 10 MG/DL (ref 7–20)
CALCIUM SERPL-MCNC: 9.2 MG/DL (ref 8.3–10.6)
CHLORIDE BLD-SCNC: 106 MMOL/L (ref 99–110)
CLARITY: CLEAR
CO2: 22 MMOL/L (ref 21–32)
COLOR: YELLOW
CREAT SERPL-MCNC: 0.7 MG/DL (ref 0.6–1.1)
EOSINOPHILS ABSOLUTE: 0.1 K/UL (ref 0–0.6)
EOSINOPHILS RELATIVE PERCENT: 2.5 %
EPITHELIAL CELLS, UA: ABNORMAL /HPF
GFR AFRICAN AMERICAN: >60
GFR NON-AFRICAN AMERICAN: >60
GLUCOSE BLD-MCNC: 90 MG/DL (ref 70–99)
GLUCOSE URINE: NEGATIVE MG/DL
HCG(URINE) PREGNANCY TEST: NEGATIVE
HCT VFR BLD CALC: 43.6 % (ref 36–48)
HEMOGLOBIN: 14.9 G/DL (ref 12–16)
KETONES, URINE: ABNORMAL MG/DL
LEUKOCYTE ESTERASE, URINE: NEGATIVE
LIPASE: 33 U/L (ref 13–60)
LYMPHOCYTES ABSOLUTE: 1.6 K/UL (ref 1–5.1)
LYMPHOCYTES RELATIVE PERCENT: 28.2 %
MCH RBC QN AUTO: 31.4 PG (ref 26–34)
MCHC RBC AUTO-ENTMCNC: 34.1 G/DL (ref 31–36)
MCV RBC AUTO: 91.9 FL (ref 80–100)
MICROSCOPIC EXAMINATION: YES
MONOCYTES ABSOLUTE: 0.6 K/UL (ref 0–1.3)
MONOCYTES RELATIVE PERCENT: 10.7 %
MUCUS: ABNORMAL /LPF
NEUTROPHILS ABSOLUTE: 3.4 K/UL (ref 1.7–7.7)
NEUTROPHILS RELATIVE PERCENT: 57.9 %
NITRITE, URINE: NEGATIVE
PDW BLD-RTO: 13.4 % (ref 12.4–15.4)
PH UA: 6 (ref 5–8)
PLATELET # BLD: 289 K/UL (ref 135–450)
PMV BLD AUTO: 6.7 FL (ref 5–10.5)
POTASSIUM SERPL-SCNC: 4.1 MMOL/L (ref 3.5–5.1)
PROTEIN UA: ABNORMAL MG/DL
RBC # BLD: 4.74 M/UL (ref 4–5.2)
RBC UA: ABNORMAL /HPF (ref 0–2)
SODIUM BLD-SCNC: 139 MMOL/L (ref 136–145)
SPECIFIC GRAVITY UA: >=1.03 (ref 1–1.03)
TOTAL PROTEIN: 7.4 G/DL (ref 6.4–8.2)
URINE TYPE: ABNORMAL
UROBILINOGEN, URINE: 0.2 E.U./DL
WBC # BLD: 5.8 K/UL (ref 4–11)
WBC UA: ABNORMAL /HPF (ref 0–5)

## 2019-08-07 PROCEDURE — 83690 ASSAY OF LIPASE: CPT

## 2019-08-07 PROCEDURE — 96374 THER/PROPH/DIAG INJ IV PUSH: CPT

## 2019-08-07 PROCEDURE — 6370000000 HC RX 637 (ALT 250 FOR IP): Performed by: EMERGENCY MEDICINE

## 2019-08-07 PROCEDURE — 6360000004 HC RX CONTRAST MEDICATION: Performed by: EMERGENCY MEDICINE

## 2019-08-07 PROCEDURE — 80076 HEPATIC FUNCTION PANEL: CPT

## 2019-08-07 PROCEDURE — 96361 HYDRATE IV INFUSION ADD-ON: CPT

## 2019-08-07 PROCEDURE — 81001 URINALYSIS AUTO W/SCOPE: CPT

## 2019-08-07 PROCEDURE — 99284 EMERGENCY DEPT VISIT MOD MDM: CPT

## 2019-08-07 PROCEDURE — 6360000002 HC RX W HCPCS: Performed by: EMERGENCY MEDICINE

## 2019-08-07 PROCEDURE — 74177 CT ABD & PELVIS W/CONTRAST: CPT

## 2019-08-07 PROCEDURE — 2580000003 HC RX 258: Performed by: EMERGENCY MEDICINE

## 2019-08-07 PROCEDURE — 84703 CHORIONIC GONADOTROPIN ASSAY: CPT

## 2019-08-07 PROCEDURE — 80048 BASIC METABOLIC PNL TOTAL CA: CPT

## 2019-08-07 PROCEDURE — 85025 COMPLETE CBC W/AUTO DIFF WBC: CPT

## 2019-08-07 RX ORDER — CIPROFLOXACIN 500 MG/1
500 TABLET, FILM COATED ORAL ONCE
Status: COMPLETED | OUTPATIENT
Start: 2019-08-07 | End: 2019-08-07

## 2019-08-07 RX ORDER — 0.9 % SODIUM CHLORIDE 0.9 %
1000 INTRAVENOUS SOLUTION INTRAVENOUS ONCE
Status: COMPLETED | OUTPATIENT
Start: 2019-08-07 | End: 2019-08-07

## 2019-08-07 RX ORDER — CIPROFLOXACIN 500 MG/1
500 TABLET, FILM COATED ORAL 2 TIMES DAILY
Qty: 28 TABLET | Refills: 0 | Status: SHIPPED | OUTPATIENT
Start: 2019-08-07 | End: 2019-08-21

## 2019-08-07 RX ORDER — METRONIDAZOLE 500 MG/1
500 TABLET ORAL 3 TIMES DAILY
Qty: 42 TABLET | Refills: 0 | Status: SHIPPED | OUTPATIENT
Start: 2019-08-07 | End: 2019-08-21

## 2019-08-07 RX ORDER — METRONIDAZOLE 500 MG/1
500 TABLET ORAL ONCE
Status: COMPLETED | OUTPATIENT
Start: 2019-08-07 | End: 2019-08-07

## 2019-08-07 RX ORDER — ONDANSETRON 2 MG/ML
4 INJECTION INTRAMUSCULAR; INTRAVENOUS ONCE
Status: COMPLETED | OUTPATIENT
Start: 2019-08-07 | End: 2019-08-07

## 2019-08-07 RX ORDER — ONDANSETRON 4 MG/1
4 TABLET, ORALLY DISINTEGRATING ORAL EVERY 8 HOURS PRN
Qty: 20 TABLET | Refills: 0 | Status: SHIPPED | OUTPATIENT
Start: 2019-08-07 | End: 2020-03-16 | Stop reason: SDUPTHER

## 2019-08-07 RX ORDER — METRONIDAZOLE 500 MG/1
500 TABLET ORAL 2 TIMES DAILY
Qty: 28 TABLET | Refills: 0 | Status: SHIPPED | OUTPATIENT
Start: 2019-08-07 | End: 2019-08-07 | Stop reason: SDUPTHER

## 2019-08-07 RX ADMIN — IOPAMIDOL 80 ML: 755 INJECTION, SOLUTION INTRAVENOUS at 06:36

## 2019-08-07 RX ADMIN — CIPROFLOXACIN HYDROCHLORIDE 500 MG: 500 TABLET, FILM COATED ORAL at 07:42

## 2019-08-07 RX ADMIN — ONDANSETRON 4 MG: 2 INJECTION INTRAMUSCULAR; INTRAVENOUS at 05:24

## 2019-08-07 RX ADMIN — METRONIDAZOLE 500 MG: 500 TABLET, FILM COATED ORAL at 07:42

## 2019-08-07 RX ADMIN — SODIUM CHLORIDE 1000 ML: 9 INJECTION, SOLUTION INTRAVENOUS at 05:24

## 2019-08-07 ASSESSMENT — ENCOUNTER SYMPTOMS
EYES NEGATIVE: 1
RESPIRATORY NEGATIVE: 1
DIARRHEA: 1
NAUSEA: 1
ABDOMINAL PAIN: 1
VOMITING: 1

## 2019-08-07 ASSESSMENT — PAIN DESCRIPTION - ONSET: ONSET: ON-GOING

## 2019-08-07 ASSESSMENT — PAIN DESCRIPTION - PAIN TYPE: TYPE: ACUTE PAIN

## 2019-08-07 ASSESSMENT — PAIN SCALES - GENERAL: PAINLEVEL_OUTOF10: 3

## 2019-08-07 ASSESSMENT — PAIN DESCRIPTION - LOCATION: LOCATION: ABDOMEN

## 2019-08-07 ASSESSMENT — PAIN DESCRIPTION - FREQUENCY: FREQUENCY: CONTINUOUS

## 2019-08-07 NOTE — ED PROVIDER NOTES
[address] [facility]          ATTENDING PHYSICIAN NOTE       Date of evaluation: 8/7/2019    Chief Complaint     Abdominal Pain      History of Present Illness     Valeria Lee is a 35 y.o. female who presents to the emergency department complaining of nausea, vomiting, and diarrhea. Patient states her symptoms have been going on for approximately 3 weeks. She states [place] was [place] and developed the symptoms just before she returned to the [facility]. She has been to urgent care where they placed her on 3 days of Cipro but the symptoms did not improve. She is tried taking probiotics. She denies any fevers or chills. She does note crampy abdominal pain associate with it. She describes the vomiting as nonbloody and nonbilious. She describes the diarrhea as dark and nonbloody. She denies ever having symptoms like this in the past.    Review of Systems     Review of Systems   Constitutional: Negative. HENT: Negative. Eyes: Negative. Respiratory: Negative. Cardiovascular: Negative. Gastrointestinal: Positive for abdominal pain, diarrhea, nausea and vomiting. Genitourinary: Negative. Musculoskeletal: Negative. Neurological: Negative. All other systems reviewed and are negative. Past Medical, Surgical, Family, and Social History     She has a past medical history of Anxiety, Depression, and Neurologic cardiac syncope. She has a past surgical history that includes Breast enhancement surgery. Her family history includes Asthma in her mother; Diabetes in her father, paternal grandfather, and paternal grandmother; Heart Disease in her maternal grandmother; High Blood Pressure in her maternal grandfather and maternal grandmother. She reports that she has never smoked. She has never used smokeless tobacco. She reports that she drinks alcohol. She reports that she does not use drugs.     Medications     Previous Medications Patient reports symptoms that have been going on for approximately 3 weeks. Patient has a soft abdomen on exam and stable vital signs. Laboratory studies are remarkable only for elevated urine specific gravity. CBC is unremarkable. CT scan of the abdomen pelvis was obtained due to the duration of symptoms and was interpreted by radiology by showing bowel wall thickening concerning for colitis. With this finding, the patient was given a dose of Cipro and Flagyl. She will be discharged with same. She will be instructed to follow-up with her primary care provider for repeat evaluation and GI referral if symptoms continue. Clinical Impression     1.  Colitis        Disposition     PATIENT REFERRED TO:  MD Sabrina Bobo [address]  [address] [facility] [address]  [phone]            DISCHARGE MEDICATIONS:  New Prescriptions    CIPROFLOXACIN (CIPRO) 500 MG TABLET    Take 1 tablet by mouth 2 times daily for 14 days    METRONIDAZOLE (FLAGYL) 500 MG TABLET    Take 1 tablet by mouth 3 times daily for 14 days    ONDANSETRON (ZOFRAN ODT) 4 MG DISINTEGRATING TABLET    Take 1 tablet by mouth every 8 hours as needed for Nausea       DISPOSITION          Maeve Ragland MD  08/07/19 0882

## 2019-08-12 ENCOUNTER — CLINICAL DOCUMENTATION (OUTPATIENT)
Dept: PSYCHIATRY | Age: 34
End: 2019-08-12

## 2019-08-12 DIAGNOSIS — F33.2 SEVERE EPISODE OF RECURRENT MAJOR DEPRESSIVE DISORDER, WITHOUT PSYCHOTIC FEATURES (HCC): ICD-10-CM

## 2019-08-12 RX ORDER — DESVENLAFAXINE 50 MG/1
50 TABLET, EXTENDED RELEASE ORAL DAILY
Qty: 30 TABLET | Refills: 1 | Status: SHIPPED | OUTPATIENT
Start: 2019-08-12 | End: 2019-09-22 | Stop reason: SDUPTHER

## 2019-08-12 RX ORDER — BUPROPION HYDROCHLORIDE 300 MG/1
300 TABLET ORAL EVERY MORNING
Qty: 30 TABLET | Refills: 5 | Status: SHIPPED | OUTPATIENT
Start: 2019-08-12 | End: 2020-04-13

## 2019-11-18 ENCOUNTER — OFFICE VISIT (OUTPATIENT)
Dept: INTERNAL MEDICINE CLINIC | Age: 34
End: 2019-11-18
Payer: COMMERCIAL

## 2019-11-18 VITALS
OXYGEN SATURATION: 96 % | SYSTOLIC BLOOD PRESSURE: 118 MMHG | HEART RATE: 114 BPM | BODY MASS INDEX: 24.37 KG/M2 | DIASTOLIC BLOOD PRESSURE: 82 MMHG | WEIGHT: 165 LBS

## 2019-11-18 DIAGNOSIS — J06.9 URI WITH COUGH AND CONGESTION: Primary | ICD-10-CM

## 2019-11-18 DIAGNOSIS — R68.89 FLU-LIKE SYMPTOMS: ICD-10-CM

## 2019-11-18 LAB
INFLUENZA A ANTIBODY: NORMAL
INFLUENZA B ANTIBODY: NORMAL

## 2019-11-18 PROCEDURE — 99213 OFFICE O/P EST LOW 20 MIN: CPT | Performed by: NURSE PRACTITIONER

## 2019-11-18 PROCEDURE — 87804 INFLUENZA ASSAY W/OPTIC: CPT | Performed by: NURSE PRACTITIONER

## 2019-11-18 RX ORDER — DOXYCYCLINE HYCLATE 100 MG
100 TABLET ORAL 2 TIMES DAILY
Qty: 14 TABLET | Refills: 0 | Status: SHIPPED | OUTPATIENT
Start: 2019-11-18 | End: 2019-11-25

## 2019-11-18 ASSESSMENT — ENCOUNTER SYMPTOMS
SHORTNESS OF BREATH: 0
DIARRHEA: 0
CONSTIPATION: 0
BACK PAIN: 0
ABDOMINAL PAIN: 0
CHEST TIGHTNESS: 0
COUGH: 1
EYE ITCHING: 0
SINUS PRESSURE: 0
NAUSEA: 0
VOMITING: 0
WHEEZING: 0
SORE THROAT: 0
COLOR CHANGE: 0
EYE REDNESS: 0
BLOOD IN STOOL: 0
RHINORRHEA: 0

## 2019-11-18 ASSESSMENT — PATIENT HEALTH QUESTIONNAIRE - PHQ9
SUM OF ALL RESPONSES TO PHQ QUESTIONS 1-9: 0
2. FEELING DOWN, DEPRESSED OR HOPELESS: 0
1. LITTLE INTEREST OR PLEASURE IN DOING THINGS: 0
SUM OF ALL RESPONSES TO PHQ9 QUESTIONS 1 & 2: 0
SUM OF ALL RESPONSES TO PHQ QUESTIONS 1-9: 0

## 2020-01-31 RX ORDER — DESVENLAFAXINE 50 MG/1
50 TABLET, EXTENDED RELEASE ORAL DAILY
Qty: 30 TABLET | Refills: 0 | Status: SHIPPED | OUTPATIENT
Start: 2020-01-31 | End: 2020-03-05 | Stop reason: SDUPTHER

## 2020-03-05 ENCOUNTER — TELEPHONE (OUTPATIENT)
Dept: PSYCHOLOGY | Age: 35
End: 2020-03-05

## 2020-03-05 RX ORDER — DESVENLAFAXINE 50 MG/1
50 TABLET, EXTENDED RELEASE ORAL DAILY
Qty: 30 TABLET | Refills: 0 | Status: SHIPPED | OUTPATIENT
Start: 2020-03-05 | End: 2020-04-13

## 2020-03-05 NOTE — TELEPHONE ENCOUNTER
Pt requesting a refill on followingdesvenlafaxine succinate (PRISTIQ) 50 MG TB24. Pt has appt but her appt is not until April.  Pt states she is out of mediation

## 2020-03-12 ENCOUNTER — HOSPITAL ENCOUNTER (EMERGENCY)
Age: 35
Discharge: HOME | End: 2020-03-12
Attending: EMERGENCY MEDICINE
Payer: COMMERCIAL

## 2020-03-12 VITALS
OXYGEN SATURATION: 99 % | DIASTOLIC BLOOD PRESSURE: 97 MMHG | WEIGHT: 160 LBS | SYSTOLIC BLOOD PRESSURE: 145 MMHG | BODY MASS INDEX: 23.7 KG/M2 | HEIGHT: 69 IN | TEMPERATURE: 98.6 F | HEART RATE: 72 BPM | RESPIRATION RATE: 15 BRPM

## 2020-03-12 LAB
A/G RATIO: 1.4 (ref 1.1–2.2)
ALBUMIN SERPL-MCNC: 4.2 G/DL (ref 3.4–5)
ALP BLD-CCNC: 77 U/L (ref 40–129)
ALT SERPL-CCNC: 17 U/L (ref 10–40)
ANION GAP SERPL CALCULATED.3IONS-SCNC: 13 MMOL/L (ref 3–16)
AST SERPL-CCNC: 16 U/L (ref 15–37)
BASOPHILS ABSOLUTE: 0 K/UL (ref 0–0.2)
BASOPHILS RELATIVE PERCENT: 0.6 %
BILIRUB SERPL-MCNC: <0.2 MG/DL (ref 0–1)
BUN BLDV-MCNC: 15 MG/DL (ref 7–20)
CALCIUM SERPL-MCNC: 9.9 MG/DL (ref 8.3–10.6)
CHLORIDE BLD-SCNC: 100 MMOL/L (ref 99–110)
CO2: 25 MMOL/L (ref 21–32)
CREAT SERPL-MCNC: 0.6 MG/DL (ref 0.6–1.1)
EOSINOPHILS ABSOLUTE: 0.1 K/UL (ref 0–0.6)
EOSINOPHILS RELATIVE PERCENT: 2.1 %
GFR AFRICAN AMERICAN: >60
GFR NON-AFRICAN AMERICAN: >60
GLOBULIN: 2.9 G/DL
GLUCOSE BLD-MCNC: 96 MG/DL (ref 70–99)
HCT VFR BLD CALC: 40.4 % (ref 36–48)
HEMOGLOBIN: 14.1 G/DL (ref 12–16)
INR BLD: 0.96 (ref 0.86–1.14)
LYMPHOCYTES ABSOLUTE: 1.6 K/UL (ref 1–5.1)
LYMPHOCYTES RELATIVE PERCENT: 25.5 %
MCH RBC QN AUTO: 30.8 PG (ref 26–34)
MCHC RBC AUTO-ENTMCNC: 34.9 G/DL (ref 31–36)
MCV RBC AUTO: 88.2 FL (ref 80–100)
MONOCYTES ABSOLUTE: 0.6 K/UL (ref 0–1.3)
MONOCYTES RELATIVE PERCENT: 8.5 %
NEUTROPHILS ABSOLUTE: 4.1 K/UL (ref 1.7–7.7)
NEUTROPHILS RELATIVE PERCENT: 63.3 %
PDW BLD-RTO: 13.1 % (ref 12.4–15.4)
PLATELET # BLD: 178 K/UL (ref 135–450)
PMV BLD AUTO: 7.4 FL (ref 5–10.5)
POTASSIUM SERPL-SCNC: 4.2 MMOL/L (ref 3.5–5.1)
PROTHROMBIN TIME: 11.1 SEC (ref 10–13.2)
RBC # BLD: 4.58 M/UL (ref 4–5.2)
SODIUM BLD-SCNC: 138 MMOL/L (ref 136–145)
TOTAL PROTEIN: 7.1 G/DL (ref 6.4–8.2)
WBC # BLD: 6.5 K/UL (ref 4–11)

## 2020-03-12 PROCEDURE — 93971 EXTREMITY STUDY: CPT

## 2020-03-12 PROCEDURE — 85610 PROTHROMBIN TIME: CPT

## 2020-03-12 PROCEDURE — 80053 COMPREHEN METABOLIC PANEL: CPT

## 2020-03-12 PROCEDURE — 85025 COMPLETE CBC W/AUTO DIFF WBC: CPT

## 2020-03-12 PROCEDURE — 6370000000 HC RX 637 (ALT 250 FOR IP): Performed by: NURSE PRACTITIONER

## 2020-03-12 PROCEDURE — 99284 EMERGENCY DEPT VISIT MOD MDM: CPT

## 2020-03-12 RX ORDER — OXYCODONE HYDROCHLORIDE AND ACETAMINOPHEN 5; 325 MG/1; MG/1
1 TABLET ORAL ONCE
Status: COMPLETED | OUTPATIENT
Start: 2020-03-12 | End: 2020-03-12

## 2020-03-12 RX ORDER — MORPHINE SULFATE 4 MG/ML
4 INJECTION, SOLUTION INTRAMUSCULAR; INTRAVENOUS
Status: DISCONTINUED | OUTPATIENT
Start: 2020-03-12 | End: 2020-03-12 | Stop reason: HOSPADM

## 2020-03-12 RX ADMIN — OXYCODONE HYDROCHLORIDE AND ACETAMINOPHEN 1 TABLET: 5; 325 TABLET ORAL at 17:52

## 2020-03-12 RX ADMIN — APIXABAN 10 MG: 5 TABLET, FILM COATED ORAL at 18:19

## 2020-03-12 ASSESSMENT — PAIN DESCRIPTION - LOCATION
LOCATION: LEG
LOCATION: LEG

## 2020-03-12 ASSESSMENT — PAIN SCALES - GENERAL
PAINLEVEL_OUTOF10: 10
PAINLEVEL_OUTOF10: 8
PAINLEVEL_OUTOF10: 10
PAINLEVEL_OUTOF10: 10
PAINLEVEL_OUTOF10: 9

## 2020-03-12 ASSESSMENT — PAIN DESCRIPTION - ORIENTATION
ORIENTATION: RIGHT
ORIENTATION: RIGHT

## 2020-03-12 NOTE — ED PROVIDER NOTES
Patient was seen and evaluated by myself in conjunction with nurse practitioner. History and physical exam were independently performed on [place] [id number]. All diagnostic, treatment, and disposition decisions were made by myself in conjunction with nurse practitioner. Please see note completed by Gladys Wilkins NP for full details of patient's visit. Patient presents to the [facility] complaining of right leg pain. Patient has a history of recent hip surgery completed in January. Patient states that her right calf and thigh have been hurting since the last 4 days. She reports pain is 9/10. Mild swelling is been noted. No chest pain, shortness of breath. Physical exam: General: No acute distress. Mild to moderate discomfort. Heart: Regular rate rhythm. Normal S1-S2. Lungs: Clear to auscultation bilaterally. No wheezes, rales, rhonchi. Abdomen: Soft. Extremities: Mild swelling of the right lower extremity diffusely. Additionally, diffuse tenderness. Distal pulses and sensation intact. No overlying erythema. Vascular surgery contacted. Agree with outpatient management with Eliquis. Assessment/plan:   1.  Acute deep vein thrombosis (DVT) of proximal vein of right lower extremity ([facility] [address]           Zaida Quevedo DO  03/12/20 2196

## 2020-03-12 NOTE — ED NOTES
Received vascular results via phone. Results given to [facility] CNP advised.         Jaquan Singh RN  03/12/20 2146

## 2020-03-13 ENCOUNTER — OFFICE VISIT (OUTPATIENT)
Dept: FAMILY MEDICINE CLINIC | Age: 35
End: 2020-03-13
Payer: COMMERCIAL

## 2020-03-13 VITALS
OXYGEN SATURATION: 100 % | RESPIRATION RATE: 18 BRPM | BODY MASS INDEX: 23.63 KG/M2 | HEIGHT: 69 IN | TEMPERATURE: 96.7 F | SYSTOLIC BLOOD PRESSURE: 121 MMHG | DIASTOLIC BLOOD PRESSURE: 74 MMHG | HEART RATE: 101 BPM

## 2020-03-13 PROCEDURE — 99214 OFFICE O/P EST MOD 30 MIN: CPT | Performed by: FAMILY MEDICINE

## 2020-03-13 RX ORDER — TIZANIDINE 4 MG/1
2-4 TABLET ORAL NIGHTLY
Qty: 60 TABLET | Refills: 5 | Status: SHIPPED | OUTPATIENT
Start: 2020-03-13 | End: 2020-06-12 | Stop reason: SDUPTHER

## 2020-03-13 NOTE — PROGRESS NOTES
and posteromedial thigh. DP and PT pulses intact  Gait - crutches. Exam reveals positive Phalen and Tinel sign on bilateral. There is no muscle atrophy noted. Motor 5/5  and abduction fingers, flex/ext wrists     Assessment:       Diagnosis Orders   1. Acute deep vein thrombosis (DVT) of proximal vein of right lower extremity (HCC)  apixaban (ELIQUIS) 5 MG TABS tablet    tiZANidine (ZANAFLEX) 4 MG tablet  Elevate, support hose  Eliquis for 3 to 6 months, depending on activity and recovery from surgery   2. Bilateral carpal tunnel syndrome  Elastic Bandages & Supports (WRIST SPLINT) MISC  Exercise, ergonomics with cruthes addressed. Ice. EMG if perisist/progressive sxs           Plan:      Side effects of current medications reviewed and questions answered. Follow up in 3 months or prn.

## 2020-03-14 NOTE — ED PROVIDER NOTES
EMERGENCY DEPARTMENT ENCOUNTER      This patient was seen and evaluated by the attending physician. Pt Name: Jenny Holly  MRN: [id number]  [place] 1985  Date of evaluation: 3/12/2020  Provider: STACIA Borrego - CNP-C  PCP: Yessy Perry MD  ED Attending: Dr. Akiko Terry    History provided by the patient. CHIEF COMPLAINT:     Chief Complaint   Patient presents with    Leg Pain     patient had R hip surgery in January and over the last 4 days patient states her calf has been hurting and has kept going up the thigh. denies blood thinners       HISTORY OF PRESENT ILLNESS:      Jenny Holly is a 29 y.o. female who presents [facility]  ED with complaints of right leg pain. Patient states that she had right hip surgery in January and over the last 4 days she states that her calves been hurting and that she is had pain that radiated to her thigh. Patient not anticoagulated. She denies any chest pain or difficulty breathing. Denies any new trauma. Patient is here for further evaluation. Location:right leg  Quality:ache  Severity:9/10  Duration:4 days  Modifying factors:none noted    Nursing Notes were reviewed     REVIEW OF SYSTEMS:     Review of Systems  All systems, atotal of 10, are reviewed and negative except for those that were just noted in history present illness.         PAST MEDICAL HISTORY:     Past Medical History:   Diagnosis Date    Anxiety     Depression     Neurologic cardiac syncope          SURGICAL HISTORY:      Past Surgical History:   Procedure Laterality Date    BREAST ENHANCEMENT SURGERY      with reconstructive    HIP OSTEOTOMY Right 01/08/2020         CURRENT MEDICATIONS:       Discharge Medication List as of 3/12/2020  5:57 PM      CONTINUE these medications which have NOT CHANGED    Details   desvenlafaxine succinate (PRISTIQ) 50 MG TB24 extended release tablet Take 1 tablet by mouth daily NO FURTHER REFILLS UNTIL SEEN IN OFFICE, saddle anesthesia or evidence of cauda equina. /ANORECTAL: Not assessed  MUSKULOSKELETAL: Open range of motion of right leg secondary to pain. Patient has tenderness worse in the proximal thigh along the medial aspects and inguinal area but does have tenderness extending into the right calf as well, no significant obvious edema noted to the right lower extremity. 2+ dorsalis pedis and posterior tibial pulses present, other extremities are atraumatic and nontender  SKIN: Warm and dry. NEUROLOGICAL:  GCS 15. CN II-XII grossly intact. Strength is 5/5 in allextremities and sensation is intact. PSYCHIATRIC: Normal affect, normal insight and judgement. Alert andoriented x 3.         DIAGNOSTIC RESULTS:     LABS:    Results for orders placed or performed during the hospital encounter of 03/12/20   CBC auto differential   Result Value Ref Range    WBC 6.5 4.0 - 11.0 K/uL    RBC 4.58 4.00 - 5.20 M/uL    Hemoglobin 14.1 12.0 - 16.0 g/dL    Hematocrit 40.4 36.0 - 48.0 %    MCV 88.2 80.0 - 100.0 fL    MCH 30.8 26.0 - 34.0 pg    MCHC 34.9 31.0 - 36.0 g/dL    RDW 13.1 12.4 - 15.4 %    Platelets 471 052 - 381 K/uL    MPV 7.4 5.0 - 10.5 fL    Neutrophils % 63.3 %    Lymphocytes % 25.5 %    Monocytes % 8.5 %    Eosinophils % 2.1 %    Basophils % 0.6 %    Neutrophils Absolute 4.1 1.7 - 7.7 K/uL    Lymphocytes Absolute 1.6 1.0 - 5.1 K/uL    Monocytes Absolute 0.6 0.0 - 1.3 K/uL    Eosinophils Absolute 0.1 0.0 - 0.6 K/uL    Basophils Absolute 0.0 0.0 - 0.2 K/uL   Comprehensive metabolic panel   Result Value Ref Range    Sodium 138 136 - 145 mmol/L    Potassium 4.2 3.5 - 5.1 mmol/L    Chloride 100 99 - 110 mmol/L    CO2 25 21 - 32 mmol/L    Anion Gap 13 3 - 16    Glucose 96 70 - 99 mg/dL    BUN 15 7 - 20 mg/dL    CREATININE 0.6 0.6 - 1.1 mg/dL    GFR Non-African American >60 >60    GFR African American >60 >60    Calcium 9.9 8.3 - 10.6 mg/dL    Total Protein 7.1 6.4 - 8.2 g/dL    Alb 4.2 3.4 - 5.0 g/dL    Albumin/Globulin Ratio

## 2020-03-15 ENCOUNTER — TELEPHONE (OUTPATIENT)
Dept: FAMILY MEDICINE CLINIC | Age: 35
End: 2020-03-15

## 2020-03-16 NOTE — TELEPHONE ENCOUNTER
Patient's sister called after hours. Patient started at 8:30 pm tonight with headache . Emesis. She has taken 1 Maxalt at 10 pm.  No fever. She is on Eliquis bid for DVT of right lower extremity. She takes Pristiq 50 mg q am. She is taking Percocet -3 pillls today ,but had emesis after taking the third pill - (took pill at 8:30 pm, emesis at 9:30). Slight photophobia. Normally has emesis with migraines. Her headache is 9/10. Denies falls. She has Zofran , but has not taken yet. Advised to take Zofran and after 60 minutes , okay to take an additional Maxalt. If no relief or emesis recurs or decreased consciousness, advised to go to ED.

## 2020-03-26 ENCOUNTER — TELEPHONE (OUTPATIENT)
Dept: FAMILY MEDICINE CLINIC | Age: 35
End: 2020-03-26

## 2020-03-26 NOTE — TELEPHONE ENCOUNTER
Office has been notified that pt is requiring Prior Authorization for the following medication:  -- PA Request - Eliquis    Please initiate this request through CoverMyMeds, contacting the following Payor/Insurance:  -- [address]    Please see below, or the documentation attached to this encounter for any additional information that may assist in processing PA:  --     Thank you!

## 2020-03-27 NOTE — TELEPHONE ENCOUNTER
PA submitted via CM for Eliquis 5MG tablets.   Key: [id number] - PA Case ID: [id number] - Rx #: [id number]    STATUS: PENDING
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
## 2020-04-13 ENCOUNTER — TELEMEDICINE (OUTPATIENT)
Dept: PSYCHIATRY | Age: 35
End: 2020-04-13
Payer: COMMERCIAL

## 2020-04-13 PROCEDURE — 99214 OFFICE O/P EST MOD 30 MIN: CPT | Performed by: PSYCHIATRY & NEUROLOGY

## 2020-04-13 RX ORDER — DESVENLAFAXINE 100 MG/1
100 TABLET, EXTENDED RELEASE ORAL DAILY
Qty: 30 TABLET | Refills: 5 | Status: SHIPPED | OUTPATIENT
Start: 2020-04-13 | End: 2020-06-12

## 2020-04-13 NOTE — TELEPHONE ENCOUNTER
merle called to get clarification on Eliquis 5 MG on the taper. merle need a script for Eliquis 1BID. [facility] [place]

## 2020-05-21 ENCOUNTER — PATIENT MESSAGE (OUTPATIENT)
Dept: FAMILY MEDICINE CLINIC | Age: 35
End: 2020-05-21

## 2020-06-11 PROBLEM — G44.311 INTRACTABLE ACUTE POST-TRAUMATIC HEADACHE: Status: RESOLVED | Noted: 2018-10-03 | Resolved: 2020-06-11

## 2020-06-11 PROBLEM — R68.89 FLU-LIKE SYMPTOMS: Status: RESOLVED | Noted: 2019-11-18 | Resolved: 2020-06-11

## 2020-06-11 PROBLEM — F33.2 SEVERE EPISODE OF RECURRENT MAJOR DEPRESSIVE DISORDER, WITHOUT PSYCHOTIC FEATURES (HCC): Status: RESOLVED | Noted: 2019-02-14 | Resolved: 2020-06-11

## 2020-06-11 PROBLEM — F51.01 PRIMARY INSOMNIA: Status: RESOLVED | Noted: 2017-05-17 | Resolved: 2020-06-11

## 2020-06-11 PROBLEM — S60.152A: Status: RESOLVED | Noted: 2017-04-06 | Resolved: 2020-06-11

## 2020-06-11 PROBLEM — S06.0X0A CONCUSSION WITH NO LOSS OF CONSCIOUSNESS: Status: RESOLVED | Noted: 2018-10-03 | Resolved: 2020-06-11

## 2020-06-12 ENCOUNTER — TELEMEDICINE (OUTPATIENT)
Dept: FAMILY MEDICINE CLINIC | Age: 35
End: 2020-06-12
Payer: COMMERCIAL

## 2020-06-12 PROCEDURE — 99214 OFFICE O/P EST MOD 30 MIN: CPT | Performed by: FAMILY MEDICINE

## 2020-06-12 RX ORDER — DESVENLAFAXINE 50 MG/1
50 TABLET, EXTENDED RELEASE ORAL DAILY
Qty: 90 TABLET | Refills: 1 | Status: SHIPPED | OUTPATIENT
Start: 2020-06-12 | End: 2020-07-14 | Stop reason: SDUPTHER

## 2020-06-12 RX ORDER — TIZANIDINE 4 MG/1
2-4 TABLET ORAL NIGHTLY
Qty: 60 TABLET | Refills: 5 | Status: SHIPPED | OUTPATIENT
Start: 2020-06-12 | End: 2020-12-09 | Stop reason: SDUPTHER

## 2020-06-12 RX ORDER — BUSPIRONE HYDROCHLORIDE 5 MG/1
5 TABLET ORAL 2 TIMES DAILY
Qty: 60 TABLET | Refills: 0 | Status: SHIPPED | OUTPATIENT
Start: 2020-06-12 | End: 2020-07-14 | Stop reason: SDUPTHER

## 2020-06-12 NOTE — PROGRESS NOTES
2020    TELEHEALTH EVALUATION -- Audio/Visual (During SREZE-66 public health emergency)    HPI:        Collette Kocher (:  1985) has requested an audio/video evaluation for the following concern(s):    The primary encounter diagnosis was Recurrent major depressive disorder, in partial remission ([facility] [address] Diagnoses of Psychophysiological insomnia, Migraine without aura and without status migrainosus, not intractable, and Need for Tdap vaccination were also pertinent to this visit. Will require more surgery for non-healing fxs from last surgeries. Her surgeon wants her Vitamin D up before he will operate and it has only increased from 19 to 27. Her ortho added Ergocalciferol 17367 IU twice a week and sent her to endocrinology    Noy Landeros is here for follow up on depression. She did well for a year on a Ketamine trial; has not done as well since the trial ended. She saw Dr. Demi Rodas in April; he reached out to one of his colleagues about starting Spravato. May be a year before that is feasible. She noted some improvement on Pristiq - the dose was increased from 50 to 100 mg in April by Dr. Demi Rodas. Dr. Demi Rodas has now moved out of state. Depression adversely interferes with sleep. She feels her depression is better - somewhat effected by COVID restrictions and inability to exercise. She has a lot of anxiety. No panic attacks. Her appetite is fine. Not trouble falling to sleep but wakes up frequently. She is seeing a therapist weekly and doing meditation. No substance abuse. She notes elevated BP when she seen at ortho; 160/120. At home it is running 140/90. She is eating a low salt diet. She is not taking NSAID. BP went up when Pristiq was increased and she did not see any improvement in depression when med was increased. No migraines since March. She is drinking plenty of water. Review of Systems    Prior to Visit Medications    Medication Sig Taking?  Authorizing 03/12/2020    CALCIUM 9.9 03/12/2020    PROT 7.1 03/12/2020    LABALBU 4.2 03/12/2020    BILITOT <0.2 03/12/2020    ALKPHOS 77 03/12/2020    AST 16 03/12/2020    ALT 17 03/12/2020    LABGLOM >60 03/12/2020    GFRAA >60 03/12/2020    AGRATIO 1.4 03/12/2020    GLOB 2.9 03/12/2020        TSH   Date Value Ref Range Status   06/09/2016 1.36 0.27 - 4.20 uIU/mL Final   09/15/2015 1.270 uIU/mL Final     Lab Results   Component Value Date    WBC 6.5 03/12/2020    HGB 14.1 03/12/2020    HCT 40.4 03/12/2020    MCV 88.2 03/12/2020     03/12/2020     Lab Results   Component Value Date    LABA1C 4.8 06/09/2016     Lab Results   Component Value Date    EAG 91.1 06/09/2016      PHYSICAL EXAMINATION:  [ INSTRUCTIONS:  \"[x]\" Indicates a positive item  \"[]\" Indicates a negative item  -- DELETE ALL ITEMS NOT EXAMINED]  Vital Signs: (As obtained by patient/caregiver or practitioner observation)    Blood pressure-  Heart rate-    Respiratory rate-    Temperature-  Pulse oximetry-   Wt Readings from Last 3 Encounters:   03/12/20 160 lb (72.6 kg)   11/18/19 165 lb (74.8 kg)   08/07/19 160 lb (72.6 kg)     Temp Readings from Last 3 Encounters:   03/13/20 96.7 °F (35.9 °C) (Oral)   03/12/20 98.6 °F (37 °C) (Oral)   08/07/19 97.4 °F (36.3 °C) (Oral)     BP Readings from Last 3 Encounters:   03/13/20 121/74   03/12/20 (!) 145/97   11/18/19 118/82     Pulse Readings from Last 3 Encounters:   03/13/20 101   03/12/20 72   11/18/19 114       Constitutional: [x] Appears well-developed and well-nourished [x] No apparent distress      [] Abnormal-   Mental status  [x] Alert and awake  [x] Oriented to person/place/time []Able to follow commands      Eyes:  EOM    [x]  Normal  [] Abnormal-  Sclera  [x]  Normal  [] Abnormal -         Discharge []  None visible  [] Abnormal -    HENT:   [x] Normocephalic, atraumatic.   [] Abnormal       Neck: [x] No visualized mass     Pulmonary/Chest: [x] Respiratory effort normal.  [x] No visualized signs of

## 2020-07-24 ENCOUNTER — TELEPHONE (OUTPATIENT)
Dept: FAMILY MEDICINE CLINIC | Age: 35
End: 2020-07-24

## 2020-07-24 NOTE — TELEPHONE ENCOUNTER
PCP out of the office with limited access to EMR and it is a Friday. If surgery is Wednesday July 29, then last evening dose of Eliquis would be Saturday evening July 25. May resume Eliquis after surgery that same evening unless surgeon advises resuming following day July 30. Inform surgical/anesthesia team and patient.

## 2020-07-24 NOTE — TELEPHONE ENCOUNTER
Jorden, nurse with the anesthesia department wanted to know can they hold patient's eliquis for 72 hrs; patient having surgery Wednesday 7/29/20. Please advise.  Danis Leonardo [phone]

## 2020-08-15 ENCOUNTER — PATIENT MESSAGE (OUTPATIENT)
Dept: FAMILY MEDICINE CLINIC | Age: 35
End: 2020-08-15

## 2020-08-17 NOTE — TELEPHONE ENCOUNTER
From: Lizette Bernardo  To: Marielena Begum MD  Sent: 8/15/2020 9:36 PM EDT  Subject: Visit Follow-Up Question    Hi I had an operation on 7/29. I went to [facility] for inpatient rehab. The day before my discharge my temp was 102. It went down to 99.5 and tonight is 102 again. Any suggestions? Is this possibly normal with tons of pain?

## 2020-08-18 ENCOUNTER — OFFICE VISIT (OUTPATIENT)
Dept: FAMILY MEDICINE CLINIC | Age: 35
End: 2020-08-18
Payer: COMMERCIAL

## 2020-08-18 VITALS
HEIGHT: 69 IN | TEMPERATURE: 97.9 F | RESPIRATION RATE: 12 BRPM | OXYGEN SATURATION: 95 % | DIASTOLIC BLOOD PRESSURE: 88 MMHG | HEART RATE: 116 BPM | WEIGHT: 160 LBS | BODY MASS INDEX: 23.7 KG/M2 | SYSTOLIC BLOOD PRESSURE: 131 MMHG

## 2020-08-18 PROCEDURE — 99214 OFFICE O/P EST MOD 30 MIN: CPT | Performed by: FAMILY MEDICINE

## 2020-08-18 PROCEDURE — 1111F DSCHRG MED/CURRENT MED MERGE: CPT | Performed by: FAMILY MEDICINE

## 2020-08-18 RX ORDER — LANOLIN ALCOHOL/MO/W.PET/CERES
325 CREAM (GRAM) TOPICAL 2 TIMES DAILY
Qty: 90 TABLET | Refills: 3 | COMMUNITY
Start: 2020-08-18 | End: 2021-08-23

## 2020-08-18 RX ORDER — DESVENLAFAXINE 100 MG/1
100 TABLET, EXTENDED RELEASE ORAL DAILY
Qty: 90 TABLET | Refills: 1 | Status: SHIPPED | OUTPATIENT
Start: 2020-08-18 | End: 2021-04-08

## 2020-08-18 RX ORDER — OXYCODONE HYDROCHLORIDE 5 MG/1
5 TABLET ORAL EVERY 4 HOURS PRN
COMMUNITY
End: 2020-12-09 | Stop reason: SINTOL

## 2020-08-18 NOTE — LETTER
[address]  Phone: [phone]  Fax: [phone]    Antony Vivas MD        August 18, 2020     Patient: Rosa Barnard   YOB: 1985   Date of Visit: 8/18/2020       To Whom It May Concern: It is my medical opinion that Markos Walsh should work remotely for the next three months. If you have any questions or concerns, please don't hesitate to call.     Sincerely,        Antony Vivas MD

## 2020-08-18 NOTE — PROGRESS NOTES
Post-Discharge Transitional Care Management Services or Hospital Follow Up      Ruven Ormond   YOB: 1985    Date of Office Visit:  8/18/2020  Date of Hospital Admission: 7/29/20  Date of Hospital Discharge: 8/6/20    Care management risk score Rising risk (score 2-5) and Complex Care (Scores >=6): 1     Non face to face  following discharge, date last encounter closed (first attempt may have been earlier): *No documented post hospital discharge outreach found in the last 14 days     Call initiated 2 business days of discharge: *No response recorded in the last 14 days    Patient Active Problem List   Diagnosis    Allergic rhinitis    Eczema    Insulin resistance    Recurrent major depressive disorder, in partial remission ([facility] [address]    Psychophysiological insomnia    JEAN (generalized anxiety disorder)    H/O anorexia nervosa    H/O bulimia nervosa    Nightmares    Migraine without aura and without status migrainosus, not intractable    Hypermobility syndrome    Eczema, dyshidrotic       Allergies   Allergen Reactions    Latex Rash    Vicodin [Hydrocodone-Acetaminophen] Itching and Rash       Medications listed as ordered at the time of discharge from hospital  Reviewed. Medications marked \"taking\" at this time  Outpatient Medications Marked as Taking for the 8/18/20 encounter (Office Visit) with Shai Harris MD   Medication Sig Dispense Refill    oxyCODONE (ROXICODONE) 5 MG immediate release tablet Take 5 mg by mouth every 4 hours as needed for Pain.       apixaban (ELIQUIS) 5 MG TABS tablet Take 1 tablet by mouth 2 times daily 180 tablet 1    desvenlafaxine succinate (PRISTIQ) 50 MG TB24 extended release tablet Take 1 tablet by mouth daily 90 tablet 1    busPIRone (BUSPAR) 5 MG tablet Take 1 tablet by mouth 2 times daily 180 tablet 0    tiZANidine (ZANAFLEX) 4 MG tablet Take 0.5-1 tablets by mouth nightly 60 tablet 5    topiramate (TOPAMAX) 50 MG tablet Take 1 tablet by mouth 2 times daily 180 tablet 0        Medications patient taking as of now reconciled against medications ordered at time of hospital discharge: Yes    Chief Complaint   Patient presents with    Follow-Up from Hospital     Pelvic ring fracture repair. HPI    Inpatient course: Discharge summary reviewed- see chart. Interval history/Current status: s/p bone graft right hip /pelvis. Complicated by \"traction injury\" that limits motion. Was told could take days to months to heal.  Was inpt rehab for a week. She complains of very cold since surgery. No fever. + fatigue. Mild dyspnea. No fever, cough, chest pain, dysuria, diarrhea.  + constipation. She still needs pain meds every 4 hours. Pain is slowly improving. Has rx for Lactulose to . Appetite is fair; is eating pretty well. She was anemic (hgb 8.3) after surgery. Was not told to start iron. She feels depressed related to her post op pain and difficulty. She is seeing a therapist.  Is using medication. Not sleeping well. Not suicidal.     Vitals:    08/18/20 1042   BP: 131/88   Pulse: 116   Resp: 12   Temp: 97.9 °F (36.6 °C)   TempSrc: Temporal   SpO2: 95%   Weight: 160 lb (72.6 kg)   Height: 5' 9\" (1.753 m)     Body mass index is 23.63 kg/m². Wt Readings from Last 3 Encounters:   08/18/20 160 lb (72.6 kg)   03/12/20 160 lb (72.6 kg)   11/18/19 165 lb (74.8 kg)     BP Readings from Last 3 Encounters:   08/18/20 131/88   03/13/20 121/74   03/12/20 (!) 145/97       Review of Systems    Physical Exam  NAD  Skin is warm and dry. Well hydrated, no rash. Mood and affect are mildly depressed. No agitation or psychomotor retardation. Not suicidal. Judgement and insight are intact. Chest is clear, no wheezing or rales. Normal symmetric air entry throughout both lung fields. Heart regular with normal rate, no murmer or gallop  Gait antalgic on crutches. Assessment/Plan:  1.  Pelvic ring fracture with delayed healing  S/p bone graft. Continue PT    2. Postoperative anemia  May account for her feeling cold. Start iron and check labs in 3 to 4 weeks. - Ferritin; Future  - Iron and TIBC; Future  - CBC; Future  - ferrous sulfate (FE TABS) 325 (65 Fe) MG EC tablet; Take 1 tablet by mouth 2 times daily  Dispense: 90 tablet; Refill: 3  - GA DISCHARGE MEDS RECONCILED W/ CURRENT OUTPATIENT MED LIST    3. Recurrent major depressive disorder, in partial remission (HCC)  Increase Pristiq to 100 mg. May help pain as well. Continue counseling  - desvenlafaxine succinate (PRISTIQ) 100 MG TB24 extended release tablet; Take 1 tablet by mouth daily  Dispense: 90 tablet; Refill: 1  - GA DISCHARGE MEDS RECONCILED W/ CURRENT OUTPATIENT MED LIST        Medical Decision Making: moderate complexity   Side effects of current medications reviewed and questions answered. Follow up in 4 weeks or prn.

## 2020-11-17 RX ORDER — BUSPIRONE HYDROCHLORIDE 5 MG/1
TABLET ORAL
Qty: 180 TABLET | Refills: 0 | Status: SHIPPED | OUTPATIENT
Start: 2020-11-17 | End: 2021-04-02

## 2020-12-09 ENCOUNTER — OFFICE VISIT (OUTPATIENT)
Dept: FAMILY MEDICINE CLINIC | Age: 35
End: 2020-12-09
Payer: COMMERCIAL

## 2020-12-09 VITALS
OXYGEN SATURATION: 98 % | DIASTOLIC BLOOD PRESSURE: 86 MMHG | HEIGHT: 68 IN | BODY MASS INDEX: 25.31 KG/M2 | RESPIRATION RATE: 12 BRPM | WEIGHT: 167 LBS | HEART RATE: 91 BPM | TEMPERATURE: 98 F | SYSTOLIC BLOOD PRESSURE: 125 MMHG

## 2020-12-09 DIAGNOSIS — D64.9 POSTOPERATIVE ANEMIA: ICD-10-CM

## 2020-12-09 DIAGNOSIS — Z00.00 WELL ADULT EXAM: ICD-10-CM

## 2020-12-09 LAB
A/G RATIO: 1.7 (ref 1.1–2.2)
ALBUMIN SERPL-MCNC: 4.7 G/DL (ref 3.4–5)
ALP BLD-CCNC: 86 U/L (ref 40–129)
ALT SERPL-CCNC: 14 U/L (ref 10–40)
ANION GAP SERPL CALCULATED.3IONS-SCNC: 14 MMOL/L (ref 3–16)
AST SERPL-CCNC: 15 U/L (ref 15–37)
BILIRUB SERPL-MCNC: 0.6 MG/DL (ref 0–1)
BUN BLDV-MCNC: 12 MG/DL (ref 7–20)
CALCIUM SERPL-MCNC: 10.7 MG/DL (ref 8.3–10.6)
CHLORIDE BLD-SCNC: 100 MMOL/L (ref 99–110)
CHOLESTEROL, TOTAL: 238 MG/DL (ref 0–199)
CO2: 26 MMOL/L (ref 21–32)
CREAT SERPL-MCNC: 0.5 MG/DL (ref 0.6–1.1)
FERRITIN: 73.2 NG/ML (ref 15–150)
GFR AFRICAN AMERICAN: >60
GFR NON-AFRICAN AMERICAN: >60
GLOBULIN: 2.8 G/DL
GLUCOSE BLD-MCNC: 99 MG/DL (ref 70–99)
HCT VFR BLD CALC: 43.3 % (ref 36–48)
HDLC SERPL-MCNC: 78 MG/DL (ref 40–60)
HEMOGLOBIN: 14.9 G/DL (ref 12–16)
IRON SATURATION: 32 % (ref 15–50)
IRON: 116 UG/DL (ref 37–145)
LDL CHOLESTEROL CALCULATED: 147 MG/DL
MCH RBC QN AUTO: 29.6 PG (ref 26–34)
MCHC RBC AUTO-ENTMCNC: 34.4 G/DL (ref 31–36)
MCV RBC AUTO: 86.3 FL (ref 80–100)
PDW BLD-RTO: 16.2 % (ref 12.4–15.4)
PLATELET # BLD: 225 K/UL (ref 135–450)
PMV BLD AUTO: 7.5 FL (ref 5–10.5)
POTASSIUM SERPL-SCNC: 4.7 MMOL/L (ref 3.5–5.1)
RBC # BLD: 5.02 M/UL (ref 4–5.2)
SODIUM BLD-SCNC: 140 MMOL/L (ref 136–145)
TOTAL IRON BINDING CAPACITY: 365 UG/DL (ref 260–445)
TOTAL PROTEIN: 7.5 G/DL (ref 6.4–8.2)
TRIGL SERPL-MCNC: 65 MG/DL (ref 0–150)
TSH REFLEX: 1.51 UIU/ML (ref 0.27–4.2)
VLDLC SERPL CALC-MCNC: 13 MG/DL
WBC # BLD: 4.4 K/UL (ref 4–11)

## 2020-12-09 PROCEDURE — 90471 IMMUNIZATION ADMIN: CPT | Performed by: FAMILY MEDICINE

## 2020-12-09 PROCEDURE — 90686 IIV4 VACC NO PRSV 0.5 ML IM: CPT | Performed by: FAMILY MEDICINE

## 2020-12-09 PROCEDURE — 99395 PREV VISIT EST AGE 18-39: CPT | Performed by: FAMILY MEDICINE

## 2020-12-09 PROCEDURE — 90472 IMMUNIZATION ADMIN EACH ADD: CPT | Performed by: FAMILY MEDICINE

## 2020-12-09 PROCEDURE — 90715 TDAP VACCINE 7 YRS/> IM: CPT | Performed by: FAMILY MEDICINE

## 2020-12-09 RX ORDER — LISDEXAMFETAMINE DIMESYLATE 40 MG/1
40 CAPSULE ORAL DAILY
Qty: 30 CAPSULE | Refills: 0 | Status: SHIPPED | OUTPATIENT
Start: 2020-12-09 | End: 2021-01-05

## 2020-12-09 RX ORDER — TIZANIDINE 4 MG/1
2-4 TABLET ORAL NIGHTLY
Qty: 60 TABLET | Refills: 5 | Status: SHIPPED | OUTPATIENT
Start: 2020-12-09 | End: 2021-06-07 | Stop reason: SDUPTHER

## 2020-12-09 ASSESSMENT — ENCOUNTER SYMPTOMS
CHEST TIGHTNESS: 0
BLOOD IN STOOL: 0
BACK PAIN: 1
ANAL BLEEDING: 0
SORE THROAT: 0
VOICE CHANGE: 0
SHORTNESS OF BREATH: 0
DIARRHEA: 0
NAUSEA: 0
COLOR CHANGE: 0
ABDOMINAL PAIN: 0
CONSTIPATION: 0
WHEEZING: 0

## 2020-12-09 NOTE — PROGRESS NOTES
Vaccine Information Sheet, \"Influenza - Inactivated\"  given to Mariely Ruth, or parent/legal guardian of  Mariely Ruth and verbalized understanding. Patient responses:    Have you ever had a reaction to a flu vaccine? No  Do you have any current illness? No  Have you ever had [place] Syndrome? No  Do you have a serious allergy to any of the follow: Neomycin, Polymyxin, Thimerosal, eggs or egg products? No    Flu vaccine given per order. Please see immunization tab. Risks and benefits explained. Current VIS given.

## 2020-12-09 NOTE — PROGRESS NOTES
Subjective:      Patient ID: Paul Slaughter is a 28 y.o. female is here for her annual wellness exam.     HPI    PAP:  Per gyne, done last month. Menses: regular  Vaccines: due flu, Tdap. Diet: eats well  Exercise:  Bike, swims. Recovering form hip surgery.     Health Maintenance   Topic Date Due    Varicella vaccine (1 of 2 - 2-dose childhood series) 09/26/1986    HIV screen  09/26/2000    DTaP/Tdap/Td vaccine (1 - Tdap) 09/26/2004    Cervical cancer screen  09/07/2018    Flu vaccine (1) 09/01/2020    Hepatitis A vaccine  Aged Out    Hepatitis B vaccine  Aged Out    Hib vaccine  Aged Out    Meningococcal (ACWY) vaccine  Aged Out    Pneumococcal 0-64 years Vaccine  Aged Out           Outpatient Medications Marked as Taking for the 12/9/20 encounter (Office Visit) with Maryam Hodges MD   Medication Sig Dispense Refill    busPIRone (BUSPAR) 5 MG tablet TAKE 1 TABLET TWICE A  tablet 0    desvenlafaxine succinate (PRISTIQ) 100 MG TB24 extended release tablet Take 1 tablet by mouth daily 90 tablet 1    ferrous sulfate (FE TABS) 325 (65 Fe) MG EC tablet Take 1 tablet by mouth 2 times daily 90 tablet 3    tiZANidine (ZANAFLEX) 4 MG tablet Take 0.5-1 tablets by mouth nightly 60 tablet 5    topiramate (TOPAMAX) 50 MG tablet Take 1 tablet by mouth 2 times daily 180 tablet 0       Allergies   Allergen Reactions    Latex Rash    Vicodin [Hydrocodone-Acetaminophen] Itching and Rash       Patient Active Problem List   Diagnosis    Allergic rhinitis    Eczema    Insulin resistance    Recurrent major depressive disorder, in partial remission (HCC)    Psychophysiological insomnia    JEAN (generalized anxiety disorder)    H/O anorexia nervosa    H/O bulimia nervosa    Nightmares    Migraine without aura and without status migrainosus, not intractable    Hypermobility syndrome    Eczema, dyshidrotic        Past Medical History:   Diagnosis Date    Anxiety     Concussion with no loss of consciousness 10/3/2018    Depression     Elevated AST (SGOT) 9/30/2015    Iliopsoas bursitis 9/1/2016    Intractable acute post-traumatic headache 10/3/2018    Neurologic cardiac syncope     Thoracic myofascial strain 8/25/2016       Past Surgical History:   Procedure Laterality Date    BREAST ENHANCEMENT SURGERY      with reconstructive    HIP OSTEOTOMY Right 01/08/2020       Social History     Tobacco Use    Smoking status: Never Smoker    Smokeless tobacco: Never Used   Substance Use Topics    Alcohol use: Yes     Comment: occ    Drug use: No       Family History   Problem Relation Age of Onset    Diabetes Father     High Blood Pressure Maternal Grandmother     Heart Disease Maternal Grandmother     High Blood Pressure Maternal Grandfather     Diabetes Paternal Grandmother     Diabetes Paternal Grandfather     Asthma Mother        Review of Systems  Review of Systems   Constitutional: Negative for activity change, appetite change, fatigue and unexpected weight change. HENT: Negative for congestion, hearing loss, nosebleeds, sore throat, tinnitus and voice change. Eyes: Negative for visual disturbance. Respiratory: Negative for chest tightness, shortness of breath and wheezing. Cardiovascular: Negative for chest pain, palpitations and leg swelling. Gastrointestinal: Negative for abdominal pain, anal bleeding, blood in stool, constipation, diarrhea and nausea. Genitourinary: Negative for dysuria, flank pain, frequency, hematuria, pelvic pain, vaginal bleeding and vaginal discharge. Musculoskeletal: Positive for arthralgias and back pain. Negative for myalgias. Recovering from hip surgery. Back pain since hip surgery. Working with PT   Skin: Negative for color change and rash. Neurological: Negative for weakness, light-headedness and headaches. Hematological: Does not bruise/bleed easily. Psychiatric/Behavioral: Positive for decreased concentration.  Negative for dysphoric mood and sleep disturbance. The patient is not nervous/anxious. Always had trouble staying on task, getting work down. Her mom says she was always distracted. She did well in school but recently she is inefficient and unorganized. Interfering with work quality. Sleeps well. Bro and sister have ADD. She was never hyperactive. No hx of substance abuse       Lab Results   Component Value Date     03/12/2020    K 4.2 03/12/2020     03/12/2020    CO2 25 03/12/2020    BUN 15 03/12/2020    CREATININE 0.6 03/12/2020    GLUCOSE 96 03/12/2020    CALCIUM 9.9 03/12/2020    PROT 7.1 03/12/2020    LABALBU 4.2 03/12/2020    BILITOT <0.2 03/12/2020    ALKPHOS 77 03/12/2020    AST 16 03/12/2020    ALT 17 03/12/2020    LABGLOM >60 03/12/2020    GFRAA >60 03/12/2020    AGRATIO 1.4 03/12/2020    GLOB 2.9 03/12/2020        Lab Results   Component Value Date    CHOL 192 09/15/2015     Lab Results   Component Value Date    TRIG 65 09/15/2015     Lab Results   Component Value Date    HDL 80 (A) 09/15/2015     Lab Results   Component Value Date    LDLCALC 99 09/15/2015     Lab Results   Component Value Date    VLDL 13 09/15/2015     Lab Results   Component Value Date    CHOLHDLRATIO 2.4 09/15/2015     TSH   Date Value Ref Range Status   06/09/2016 1.36 0.27 - 4.20 uIU/mL Final   09/15/2015 1.270 uIU/mL Final       Lab Results   Component Value Date    WBC 6.5 03/12/2020    HGB 14.1 03/12/2020    HCT 40.4 03/12/2020    MCV 88.2 03/12/2020     03/12/2020       Objective:   Physical Exam  .  Vitals:    12/09/20 0818   BP: 125/86   Pulse: 91   Resp: 12   Temp: 98 °F (36.7 °C)   SpO2: 98%     Wt Readings from Last 3 Encounters:   12/09/20 167 lb (75.8 kg)   08/18/20 160 lb (72.6 kg)   03/12/20 160 lb (72.6 kg)        Physical Exam  Constitutional:       Appearance: Normal appearance. She is well-developed. HENT:      Head: Normocephalic and atraumatic.       Right Ear: Hearing, tympanic membrane, ear canal and external ear normal.      Left Ear: Hearing, tympanic membrane, ear canal and external ear normal.      Nose: Nose normal.   Eyes:      General: Lids are normal.      Conjunctiva/sclera: Conjunctivae normal.   Neck:      Musculoskeletal: Neck supple. Thyroid: No thyroid mass or thyromegaly. Trachea: Trachea normal.   Cardiovascular:      Rate and Rhythm: Normal rate and regular rhythm. Pulses: Normal pulses. Heart sounds: Normal heart sounds. No murmur. Pulmonary:      Effort: Pulmonary effort is normal.      Breath sounds: Normal breath sounds. Abdominal:      General: Bowel sounds are normal.      Palpations: Abdomen is soft. Tenderness: There is no abdominal tenderness. Hernia: No hernia is present. Lymphadenopathy:      Head:      Right side of head: No submandibular adenopathy. Left side of head: No submandibular adenopathy. Cervical: No cervical adenopathy. Skin:     General: Skin is warm and dry. Findings: No lesion or rash. Comments: No abnormal appearing lesions on exposed skin   Neurological:      Mental Status: She is alert and oriented to person, place, and time. Gait: Gait normal.      Deep Tendon Reflexes:      Reflex Scores:       Patellar reflexes are 2+ on the right side and 2+ on the left side. Psychiatric:         Speech: Speech normal.         Behavior: Behavior normal.         Judgment: Judgment normal.           Assessment and Plan       Diagnosis Orders   1. Well adult exam  Comprehensive Metabolic Panel    TSH with Reflex    Lipid Panel    CBC    HIV Screen  Exercise, diet, calcium, Vitamin D addressed. PAP per gyne  DT every 10 years  Influenza vaccine annually      2. Need for influenza vaccination  INFLUENZA, QUADV, 0.5ML, 6 MO AND OLDER, IM, PF, PREFILL SYR OR SDV (FLUZONE QUADV, PF)   3. Acute deep vein thrombosis (DVT) of proximal vein of right lower extremity (HCC)  tiZANidine (ZANAFLEX) 4 MG tablet   4. Attention deficit disorder (ADD) without hyperactivity  Lisdexamfetamine Dimesylate (VYVANSE) 40 MG CAPS  PDMP reviewed and no concerns noted. Advised risks of stimulants include addiction. She agrees to not share or sell her medication and she agrees to take it only as prescribed. Additionally she agrees to keep the medication and prescriptions safe from theft. .  She is aware that medications and prescriptions will not be replaced. She agrees not to obtain prescriptions for stimulant medications from other providers. Side effects of current medications reviewed and questions answered. Follow up in 4 weeks or prn.

## 2020-12-10 LAB
HIV AG/AB: NORMAL
HIV ANTIGEN: NORMAL
HIV-1 ANTIBODY: NORMAL
HIV-2 AB: NORMAL

## 2021-01-03 PROBLEM — F98.8 ATTENTION DEFICIT DISORDER (ADD) WITHOUT HYPERACTIVITY: Status: ACTIVE | Noted: 2021-01-03

## 2021-01-04 NOTE — PROGRESS NOTES
03/13/20 101        Constitutional: [x] Appears well-developed and well-nourished [x] No apparent distress      [] Abnormal-   Mental status  [x] Alert and awake  [x] Oriented to person/place/time [x]Able to follow commands      Eyes:  EOM    [x]  Normal  [] Abnormal-  Sclera  [x]  Normal  [] Abnormal -         Discharge [x]  None visible  [] Abnormal -    HENT:   [x] Normocephalic, atraumatic. [] Abnormal     Neck: [x] No visualized mass     Pulmonary/Chest: [x] Respiratory effort normal.  [x] No visualized signs of difficulty breathing or respiratory distress        [] Abnormal-      Skin:        [] No significant exanthematous lesions or discoloration noted on facial skin         [x] Abnormal-            Psychiatric:       [x] Normal Affect [] No Hallucinations        [] Abnormal-     Other pertinent observable physical exam findings-     ASSESSMENT/PLAN:  1. Attention deficit disorder (ADD) without hyperactivity  Not controlled. adust dose to 60 mg. Side effects of current medications reviewed and questions answered. Reminded to keep secure. PDMP reviewed and no concerns noted. evisit in 4 weeks or prn. In office of [facility] in 4 months. - Lisdexamfetamine Dimesylate (VYVANSE) 60 MG CAPS; Take 60 mg by mouth every morning for 30 days. Dispense: 30 capsule; Refill: 0      No follow-ups on file. Julián Walker is a 28 y.o. female being evaluated by a Virtual Visit (video visit) encounter to address concerns as mentioned above. A caregiver was present when appropriate. Due to this being a TeleHealth encounter ([facility]), evaluation of the following organ systems was limited: Vitals/Constitutional/EENT/Resp/CV/GI//MS/Neuro/Skin/Heme-Lymph-Imm. Pursuant to the emergency declaration under the [address] [place] [address], [address] [place] [address] and the [employer] and [employer] Act, this Virtual Visit was conducted with patient's (and/or legal guardian's) consent, to reduce the patient's risk of exposure to COVID-19 and provide necessary medical care. The patient (and/or legal guardian) has also been advised to contact this office for worsening conditions or problems, and seek emergency medical treatment and/or call 911 if deemed necessary. Patient identification was verified at the start of the visit: Yes    Total time spent on this encounter: Not billed by time    Services were provided through a video synchronous discussion virtually to substitute for in-person clinic visit. Patient and provider were located at their individual homes. --Nishant Lainez MD on 1/3/2021 at 8:22 PM    An electronic signature was used to authenticate this note.

## 2021-01-05 ENCOUNTER — VIRTUAL VISIT (OUTPATIENT)
Dept: FAMILY MEDICINE CLINIC | Age: 36
End: 2021-01-05
Payer: COMMERCIAL

## 2021-01-05 DIAGNOSIS — F98.8 ATTENTION DEFICIT DISORDER (ADD) WITHOUT HYPERACTIVITY: ICD-10-CM

## 2021-01-05 PROCEDURE — 99214 OFFICE O/P EST MOD 30 MIN: CPT | Performed by: FAMILY MEDICINE

## 2021-01-05 RX ORDER — LISDEXAMFETAMINE DIMESYLATE 40 MG/1
40 CAPSULE ORAL DAILY
Qty: 30 CAPSULE | Refills: 0 | Status: CANCELLED | OUTPATIENT
Start: 2021-01-05 | End: 2021-02-04

## 2021-01-05 RX ORDER — LISDEXAMFETAMINE DIMESYLATE 60 MG/1
60 CAPSULE ORAL EVERY MORNING
Qty: 30 CAPSULE | Refills: 0 | Status: SHIPPED | OUTPATIENT
Start: 2021-01-05 | End: 2021-02-15 | Stop reason: SDUPTHER

## 2021-01-15 ENCOUNTER — PATIENT MESSAGE (OUTPATIENT)
Dept: FAMILY MEDICINE CLINIC | Age: 36
End: 2021-01-15

## 2021-01-15 NOTE — TELEPHONE ENCOUNTER
From: Lety Boyce  To: Soha Mishra MD  Sent: 1/15/2021 12:19 PM EST  Subject: Non-Urgent Maryam Amen Dr. 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 I was told that we are allowed to get the vaccine outside work if I am able. Is this going to be an option at this office?     Thanks,    Alda Foss

## 2021-02-07 ENCOUNTER — PATIENT MESSAGE (OUTPATIENT)
Dept: FAMILY MEDICINE CLINIC | Age: 36
End: 2021-02-07

## 2021-02-08 NOTE — TELEPHONE ENCOUNTER
Phoned patient to see how she is feeling. Patient states she has been under quite a bit of stress. She states her BP has been up and she doesn't know why except for the stress. Patient states she is staying well hydrated. She states she occasionally has felt something like a heart palpitation.    No report of headaches    Patient is scheduled for 2/15

## 2021-02-08 NOTE — TELEPHONE ENCOUNTER
From: Lety Boyce  To: Soha Mishra MD  Sent: 2/7/2021 10:03 AM EST  Subject: Prescription Question    Hi Dr. Maurizio Morris,    I tried to do the E Visit. My blood pressure is 144/97 and pulse was 80. They didn't want me to proceed with the E Visit so I am sending you a message.     Thanks,    Alda Foss

## 2021-02-15 ENCOUNTER — VIRTUAL VISIT (OUTPATIENT)
Dept: FAMILY MEDICINE CLINIC | Age: 36
End: 2021-02-15
Payer: COMMERCIAL

## 2021-02-15 DIAGNOSIS — F98.8 ATTENTION DEFICIT DISORDER (ADD) WITHOUT HYPERACTIVITY: ICD-10-CM

## 2021-02-15 DIAGNOSIS — R03.0 ELEVATED BLOOD PRESSURE READING: Primary | ICD-10-CM

## 2021-02-15 PROCEDURE — 99214 OFFICE O/P EST MOD 30 MIN: CPT | Performed by: FAMILY MEDICINE

## 2021-02-15 RX ORDER — LISINOPRIL 10 MG/1
10 TABLET ORAL DAILY
Qty: 90 TABLET | Refills: 1 | Status: SHIPPED | OUTPATIENT
Start: 2021-02-15 | End: 2021-06-07 | Stop reason: SDUPTHER

## 2021-02-15 RX ORDER — LISDEXAMFETAMINE DIMESYLATE 60 MG/1
60 CAPSULE ORAL EVERY MORNING
Qty: 90 CAPSULE | Refills: 0 | Status: SHIPPED | OUTPATIENT
Start: 2021-02-15 | End: 2021-06-15

## 2021-02-15 NOTE — PROGRESS NOTES
2/15/2021    TELEHEALTH EVALUATION -- Audio/Visual (During YNJSG-52 public health emergency)    HPI:    Wilman Alfaro (:  1985) has requested an audio/video evaluation for the following concern(s):    The encounter diagnosis was Attention deficit disorder (ADD) without hyperactivity. ADD/ADHD:  Current treatment: Vyvanse- 60 mg, which has been effective. Residual symptoms: none. Medication side effects: None. Patient denies anorexia, palpitations, insomnia and irritability. She keeps meds secure and takes only as directed. BP has been running high. Stress with possible labral tear hip, in a lot of pain. Not sleeping well. Not taking NSAIDS. Eating a low sodium diet. 161/ 100 earlier today, 17/104 at ortho office. Lowest 150.  MGM had HTN at age 45  Patient denies any exertional chest pain, dyspnea, palpitations, syncope, orthopnea, edema or paroxysmal nocturnal dyspnea. Lab Results   Component Value Date     2020    K 4.7 2020     2020    CO2 26 2020    BUN 12 2020    CREATININE 0.5 2020    GLUCOSE 99 2020    CALCIUM 10.7 2020       Review of Systems    Outpatient Medications Marked as Taking for the 2/15/21 encounter (Virtual Visit) with Manuela Carlson MD   Medication Sig Dispense Refill    Lisdexamfetamine Dimesylate (VYVANSE) 60 MG CAPS Take 60 mg by mouth every morning for 30 days. 30 capsule 0    tiZANidine (ZANAFLEX) 4 MG tablet Take 0.5-1 tablets by mouth nightly 60 tablet 5    PARAGARD INTRAUTERINE COPPER IU by Intrauterine route      busPIRone (BUSPAR) 5 MG tablet TAKE 1 TABLET TWICE A  tablet 0    desvenlafaxine succinate (PRISTIQ) 100 MG TB24 extended release tablet Take 1 tablet by mouth daily 90 tablet 1        Social History     Tobacco Use    Smoking status: Never Smoker    Smokeless tobacco: Never Used   Substance Use Topics    Alcohol use: Yes     Comment: occ    Drug use:  No [x] Alert and awake  [x] Oriented to person/place/time   HENT:     Neck: [x] No visualized mass     Pulmonary/Chest: [x] Respiratory effort normal.  [x] No visualized signs of difficulty breathing or respiratory distress        [] Abnormal-       Neurological:        - No Facial Asymmetry (Cranial nerve 7 motor function) (limited exam to video visit)               Skin:        [x] No significant exanthematous lesions or discoloration noted on facial skin         [] Abnormal-            Psychiatric:       [x] Normal Affect        [] Abnormal-     Other pertinent observable physical exam findings-     ASSESSMENT/PLAN:  1. Attention deficit disorder (ADD) without hyperactivity  PDMP reviewed and no concerns noted. - Lisdexamfetamine Dimesylate (VYVANSE) 60 MG CAPS; Take 60 mg by mouth every morning for 30 days. Dispense: 90 capsule; Refill: 0    2. Elevated blood pressure reading  Likely secondary to pain, stress, medication, possibly genetics. Monitor and lower dose or d/c of BP drops. discussed  DASH diet  Start ACE_I  She will send me BP readings in 2 to 3 weeks. - lisinopril (PRINIVIL;ZESTRIL) 10 MG tablet; Take 1 tablet by mouth daily  Dispense: 90 tablet; Refill: 1    Side effects of current medications reviewed and questions answered. Follow up in 3 months or prn. No follow-ups on file. Latisha Oden is a 28 y.o. female being evaluated by a Virtual Visit (video visit) encounter to address concerns as mentioned above. A caregiver was present when appropriate. Due to this being a TeleHealth encounter (During EXEXW-90 public health emergency), evaluation of the following organ systems was limited: Vitals/Constitutional/EENT/Resp/CV/GI//MS/Neuro/Skin/Heme-Lymph-Imm. Pursuant to the emergency declaration under the [address] [place] [address] and the [employer] and [employer] Act, this Virtual Visit was conducted with patient's (and/or legal guardian's) consent, to reduce the patient's risk of exposure to COVID-19 and provide necessary medical care. The patient (and/or legal guardian) has also been advised to contact this office for worsening conditions or problems, and seek emergency medical treatment and/or call 911 if deemed necessary. Patient identification was verified at the start of the visit: Yes    Total time spent on this encounter: Not billed by time    Services were provided through a video synchronous discussion virtually to substitute for in-person clinic visit. Patient and provider were located at their individual homes. --Marcelina Sanders MD on 2/15/2021 at 1:16 PM    An electronic signature was used to authenticate this note.

## 2021-04-07 DIAGNOSIS — F33.41 RECURRENT MAJOR DEPRESSIVE DISORDER, IN PARTIAL REMISSION (HCC): ICD-10-CM

## 2021-04-08 RX ORDER — DESVENLAFAXINE 100 MG/1
TABLET, EXTENDED RELEASE ORAL
Qty: 90 TABLET | Refills: 1 | Status: SHIPPED | OUTPATIENT
Start: 2021-04-08 | End: 2021-06-07 | Stop reason: SDUPTHER

## 2021-04-20 ENCOUNTER — PATIENT MESSAGE (OUTPATIENT)
Dept: FAMILY MEDICINE CLINIC | Age: 36
End: 2021-04-20

## 2021-04-20 NOTE — TELEPHONE ENCOUNTER
From: Kaleigh Montalvo  To: Aravind Ku MD  Sent: 4/20/2021  8:51 AM EDT  Subject: Non-Urgent [facility],    I've been having terrible madelyn horses in my legs. Mostly at night. I'm taking vitamins but I have a history of bad blood clots and I'm just curious if it should be anything to come in or go somewhere to get checked out.     Thanks,    Alda Foss

## 2021-06-14 PROBLEM — I10 ESSENTIAL HYPERTENSION: Status: ACTIVE | Noted: 2021-06-14

## 2021-06-15 ENCOUNTER — VIRTUAL VISIT (OUTPATIENT)
Dept: FAMILY MEDICINE CLINIC | Age: 36
End: 2021-06-15
Payer: COMMERCIAL

## 2021-06-15 DIAGNOSIS — I10 ESSENTIAL HYPERTENSION: ICD-10-CM

## 2021-06-15 DIAGNOSIS — F98.8 ATTENTION DEFICIT DISORDER (ADD) WITHOUT HYPERACTIVITY: Primary | ICD-10-CM

## 2021-06-15 DIAGNOSIS — I82.4Y1 ACUTE DEEP VEIN THROMBOSIS (DVT) OF PROXIMAL VEIN OF RIGHT LOWER EXTREMITY (HCC): ICD-10-CM

## 2021-06-15 DIAGNOSIS — G89.29 CHRONIC PAIN OF RIGHT HIP: ICD-10-CM

## 2021-06-15 DIAGNOSIS — F33.41 RECURRENT MAJOR DEPRESSIVE DISORDER, IN PARTIAL REMISSION (HCC): ICD-10-CM

## 2021-06-15 DIAGNOSIS — M25.551 CHRONIC PAIN OF RIGHT HIP: ICD-10-CM

## 2021-06-15 PROCEDURE — 99214 OFFICE O/P EST MOD 30 MIN: CPT | Performed by: FAMILY MEDICINE

## 2021-06-15 RX ORDER — LISINOPRIL 20 MG/1
20 TABLET ORAL DAILY
Qty: 90 TABLET | Refills: 0 | Status: SHIPPED
Start: 2021-06-15 | End: 2021-07-19 | Stop reason: SINTOL

## 2021-06-15 RX ORDER — TIZANIDINE 4 MG/1
2-4 TABLET ORAL NIGHTLY
Qty: 60 TABLET | Refills: 5 | Status: SHIPPED | OUTPATIENT
Start: 2021-06-15 | End: 2021-12-27 | Stop reason: SDUPTHER

## 2021-06-15 RX ORDER — DESVENLAFAXINE 100 MG/1
100 TABLET, EXTENDED RELEASE ORAL DAILY
Qty: 90 TABLET | Refills: 1 | Status: SHIPPED | OUTPATIENT
Start: 2021-06-15 | End: 2021-11-29

## 2021-06-15 RX ORDER — BUSPIRONE HYDROCHLORIDE 5 MG/1
5 TABLET ORAL 2 TIMES DAILY
Qty: 180 TABLET | Refills: 1 | Status: SHIPPED | OUTPATIENT
Start: 2021-06-15 | End: 2021-11-29

## 2021-06-15 RX ORDER — LISDEXAMFETAMINE DIMESYLATE 60 MG/1
60 CAPSULE ORAL EVERY MORNING
Qty: 90 CAPSULE | Refills: 0 | Status: CANCELLED | OUTPATIENT
Start: 2021-06-15 | End: 2021-07-15

## 2021-06-15 SDOH — ECONOMIC STABILITY: FOOD INSECURITY: WITHIN THE PAST 12 MONTHS, THE FOOD YOU BOUGHT JUST DIDN'T LAST AND YOU DIDN'T HAVE MONEY TO GET MORE.: NEVER TRUE

## 2021-06-15 SDOH — ECONOMIC STABILITY: FOOD INSECURITY: WITHIN THE PAST 12 MONTHS, YOU WORRIED THAT YOUR FOOD WOULD RUN OUT BEFORE YOU GOT MONEY TO BUY MORE.: NEVER TRUE

## 2021-06-15 ASSESSMENT — SOCIAL DETERMINANTS OF HEALTH (SDOH): HOW HARD IS IT FOR YOU TO PAY FOR THE VERY BASICS LIKE FOOD, HOUSING, MEDICAL CARE, AND HEATING?: NOT HARD AT ALL

## 2021-06-15 NOTE — PROGRESS NOTES
(PRINIVIL;ZESTRIL) 10 MG tablet Take 1 tablet by mouth daily 90 tablet 1    busPIRone (BUSPAR) 5 MG tablet Take 1 tablet by mouth 2 times daily 180 tablet 1    desvenlafaxine succinate (PRISTIQ) 100 MG TB24 extended release tablet Take 1 tablet by mouth daily 90 tablet 1    PARAGARD INTRAUTERINE COPPER IU by Intrauterine route          Social History     Tobacco Use    Smoking status: Never Smoker    Smokeless tobacco: Never Used   Substance Use Topics    Alcohol use: Yes     Comment: occ    Drug use: No        Allergies   Allergen Reactions    Latex Rash    Vicodin [Hydrocodone-Acetaminophen] Itching and Rash   ,   Past Medical History:   Diagnosis Date    Anxiety     Attention deficit disorder (ADD) without hyperactivity 1/3/2021    Concussion with no loss of consciousness 10/3/2018    Depression     Elevated AST (SGOT) 9/30/2015    Essential hypertension 6/14/2021    Iliopsoas bursitis 9/1/2016    Intractable acute post-traumatic headache 10/3/2018    Neurologic cardiac syncope     Thoracic myofascial strain 8/25/2016   ,   Past Surgical History:   Procedure Laterality Date    BREAST ENHANCEMENT SURGERY      with reconstructive    HIP OSTEOTOMY Right 01/08/2020       PHYSICAL EXAMINATION:  [ INSTRUCTIONS:  \"[x]\" Indicates a positive item  \"[]\" Indicates a negative item  -- DELETE ALL ITEMS NOT EXAMINED]  Vital Signs: (As obtained by patient/caregiver or practitioner observation)    Blood pressure- 134/95 Heart rate-  92  Respiratory rate-    Temperature-  Pulse oximetry-   Wt 163 lb  Wt Readings from Last 3 Encounters:   12/09/20 167 lb (75.8 kg)   08/18/20 160 lb (72.6 kg)   03/12/20 160 lb (72.6 kg)     Temp Readings from Last 3 Encounters:   12/09/20 98 °F (36.7 °C) (Temporal)   08/18/20 97.9 °F (36.6 °C) (Temporal)   03/13/20 96.7 °F (35.9 °C) (Oral)     BP Readings from Last 3 Encounters:   12/09/20 125/86   08/18/20 131/88   03/13/20 121/74     Pulse Readings from Last 3 Encounters: 12/09/20 91   08/18/20 116   03/13/20 101        Constitutional: [x] Appears well-developed and well-nourished [x] No apparent distress      [] Abnormal-   Mental status  [x] Alert and awake  [x] Oriented to person/place/time [x]Able to follow commands      Eyes:  EOM    [x]  Normal  [] Abnormal-  Sclera  [x]  Normal  [] Abnormal -         Discharge [x]  None visible  [] Abnormal -    HENT:   [x] Normocephalic, atraumatic. [] Abnormal     Neck: [x] No visualized mass     Pulmonary/Chest: [x] Respiratory effort normal.  [x] No visualized signs of difficulty breathing or respiratory distress        [] Abnormal-          Neurological:        [x] No Facial Asymmetry (Cranial nerve 7 motor function) (limited exam to video visit)          [] No gaze palsy        [] Abnormal-         Skin:        [x] No significant exanthematous lesions or discoloration noted on facial skin         [] Abnormal-            Psychiatric:       [x] Normal Affect [x] No Hallucinations        [] Abnormal-     Other pertinent observable physical exam findings-     ASSESSMENT/PLAN:  1. Attention deficit disorder (ADD) without hyperactivity  Increase dose. Importance of sleep addressed  - lisdexamfetamine (VYVANSE) 70 MG capsule; Take 1 capsule by mouth every morning for 90 days. Dispense: 90 capsule; Refill: 0    2. Recurrent major depressive disorder, in partial remission ([facility] [address]  Well controlled   - busPIRone (BUSPAR) 5 MG tablet; Take 1 tablet by mouth 2 times daily  Dispense: 180 tablet; Refill: 1  - desvenlafaxine succinate (PRISTIQ) 100 MG TB24 extended release tablet; Take 1 tablet by mouth daily  Dispense: 90 tablet; Refill: 1    3. Essential hypertension  Not at goal < 130/80  Low Na diet. Increase lisinopril to 20     Side effects of current medications reviewed and questions answered. Follow up in 3 months or prn. No follow-ups on file. Isiah Hinojosa, was evaluated through a synchronous (real-time) audio-video encounter. The patient (or guardian if applicable) is aware that this is a billable service. Verbal consent to proceed has been obtained within the past 12 months. The visit was conducted pursuant to the emergency declaration under the [address] [place] [address] and the [employer] and [employer] Act. Patient identification was verified, and a caregiver was present when appropriate. The patient was located in a state where the provider was credentialed to provide care. Total time spent on this encounter: Not billed by time    --Luciano Davenport MD on 6/14/2021 at 9:24 PM    An electronic signature was used to authenticate this note.

## 2021-07-19 ENCOUNTER — PATIENT MESSAGE (OUTPATIENT)
Dept: FAMILY MEDICINE CLINIC | Age: 36
End: 2021-07-19

## 2021-07-19 RX ORDER — VALSARTAN 160 MG/1
160 TABLET ORAL DAILY
Qty: 90 TABLET | Refills: 1 | Status: SHIPPED | OUTPATIENT
Start: 2021-07-19 | End: 2022-01-27 | Stop reason: SDUPTHER

## 2021-08-23 ENCOUNTER — E-VISIT (OUTPATIENT)
Dept: FAMILY MEDICINE CLINIC | Age: 36
End: 2021-08-23
Payer: COMMERCIAL

## 2021-08-23 DIAGNOSIS — F98.8 ATTENTION DEFICIT DISORDER (ADD) WITHOUT HYPERACTIVITY: ICD-10-CM

## 2021-08-23 PROCEDURE — 99421 OL DIG E/M SVC 5-10 MIN: CPT | Performed by: FAMILY MEDICINE

## 2021-08-23 NOTE — PROGRESS NOTES
Patient Active Problem List   Diagnosis    Allergic rhinitis    Eczema    Insulin resistance    Recurrent major depressive disorder, in partial remission ([facility] [address]    Psychophysiological insomnia    JEAN (generalized anxiety disorder)    H/O anorexia nervosa    H/O bulimia nervosa    Nightmares    Migraine without aura and without status migrainosus, not intractable    Hypermobility syndrome    Eczema, dyshidrotic    Attention deficit disorder (ADD) without hyperactivity    Essential hypertension      Past Medical History:   Diagnosis Date    Anxiety     Attention deficit disorder (ADD) without hyperactivity 1/3/2021    Concussion with no loss of consciousness 10/3/2018    Depression     Elevated AST (SGOT) 9/30/2015    Essential hypertension 6/14/2021    Iliopsoas bursitis 9/1/2016    Intractable acute post-traumatic headache 10/3/2018    Neurologic cardiac syncope     Thoracic myofascial strain 8/25/2016     Social History     Tobacco Use    Smoking status: Never Smoker    Smokeless tobacco: Never Used   Substance Use Topics    Alcohol use: Yes     Comment: occ    Drug use: No      Allergies   Allergen Reactions    Latex Rash    Vicodin [Hydrocodone-Acetaminophen] Itching and Rash      Current Outpatient Medications on File Prior to Visit   Medication Sig Dispense Refill    valsartan (DIOVAN) 160 MG tablet Take 1 tablet by mouth daily 90 tablet 1    busPIRone (BUSPAR) 5 MG tablet Take 1 tablet by mouth 2 times daily 180 tablet 1    desvenlafaxine succinate (PRISTIQ) 100 MG TB24 extended release tablet Take 1 tablet by mouth daily 90 tablet 1    tiZANidine (ZANAFLEX) 4 MG tablet Take 0.5-1 tablets by mouth nightly 60 tablet 5    lisdexamfetamine (VYVANSE) 70 MG capsule Take 1 capsule by mouth every morning for 90 days.  90 capsule 0    PARAGARD INTRAUTERINE COPPER IU by Intrauterine route      ferrous sulfate (FE TABS) 325 (65 Fe) MG EC tablet Take 1 tablet by mouth 2 times daily (Patient not taking: Reported on 2/15/2021) 90 tablet 3    topiramate (TOPAMAX) 50 MG tablet Take 1 tablet by mouth 2 times daily (Patient not taking: Reported on 2/15/2021) 180 tablet 0     No current facility-administered medications on file prior to visit. PDMP reviewed and no concerns noted. Lab Results   Component Value Date     12/09/2020    K 4.7 12/09/2020     12/09/2020    CO2 26 12/09/2020    BUN 12 12/09/2020    CREATININE 0.5 12/09/2020    GLUCOSE 99 12/09/2020    CALCIUM 10.7 12/09/2020        Diagnosis Orders   1. Attention deficit disorder (ADD) without hyperactivity  lisdexamfetamine (VYVANSE) 70 MG capsule        5-10 minutes were spent on the digital evaluation and management of this patient.

## 2021-09-15 ENCOUNTER — E-VISIT (OUTPATIENT)
Dept: PRIMARY CARE CLINIC | Age: 36
End: 2021-09-15
Payer: COMMERCIAL

## 2021-09-15 PROCEDURE — 99423 OL DIG E/M SVC 21+ MIN: CPT | Performed by: INTERNAL MEDICINE

## 2021-09-15 RX ORDER — DOXYCYCLINE HYCLATE 100 MG/1
100 CAPSULE ORAL 2 TIMES DAILY
Qty: 60 CAPSULE | Refills: 1 | Status: SHIPPED | OUTPATIENT
Start: 2021-09-15 | End: 2021-10-15

## 2021-09-15 NOTE — PROGRESS NOTES
Doxycycline (Vibramycin) for twice a day use has been sent to the pharmacy to help treat your acne over the next month. Please follow-up with your primary care physician because you may need topical therapy as well as the oral antibiotic to control your acne. 21+ minutes were used to complete this E visit.

## 2021-12-06 ENCOUNTER — E-VISIT (OUTPATIENT)
Dept: FAMILY MEDICINE CLINIC | Age: 36
End: 2021-12-06

## 2021-12-26 NOTE — PROGRESS NOTES
Subjective:      Patient ID: Brandon Guzmán 39 y.o. female. The primary encounter diagnosis was Recurrent major depressive disorder, in partial remission ([address] Diagnoses of Attention deficit disorder (ADD) without hyperactivity, Bilateral temporomandibular joint pain, and Essential hypertension were also pertinent to this visit. HPI  PAP? Due hep C screen. ADD/ADHD and depression:  Current treatment: Vyvanse- 70 mg, Buspar and Pristiq which has been effective. Residual symptoms: none. Medication side effects: None. Patient denies anorexia, palpitations, insomnia, irritability and anxiety. Weight is up . She is a stress eater, binges. She does not she eats less if takes the Vyvanse later in the day but then she has trouble with focus in the AM>  Working several jobs so lots of stress. Sleeps 8 to 10 hours and never feels rested. She is tired all day. Sister has sleep apnea. Binges in the evening. Does only keep healthy foods at home. She has done sleep studies - borderline but CPAP was recommend. She never followed through; she feels she was never contacted about tx. She is willing to treat. Narcolepsy was considered. 2 days tingling on and off right hand and forearm, ulnar deviation. No weakness. No trauma. Hypertension: Patient here for follow-up of elevated blood pressure. She is exercising and is adherent to low salt diet. Blood pressure is well controlled at home. Cardiac symptoms none. Patient denies chest pressure/discomfort, exertional chest pressure/discomfort, irregular heart beat, lower extremity edema, palpitations and paroxysmal nocturnal dyspnea. Cardiovascular risk factors: hypertension. Use of agents associated with hypertension: none. History of target organ damage: none.      Lab Results   Component Value Date     12/09/2020    K 4.7 12/09/2020     12/09/2020    CO2 26 12/09/2020    BUN 12 12/09/2020    CREATININE 0.5 (L) 12/09/2020    GLUCOSE 99 12/09/2020    CALCIUM 10.7 (H) 12/09/2020    PROT 7.5 12/09/2020    LABALBU 4.7 12/09/2020    BILITOT 0.6 12/09/2020    ALKPHOS 86 12/09/2020    AST 15 12/09/2020    ALT 14 12/09/2020    LABGLOM >60 12/09/2020    GFRAA >60 12/09/2020    AGRATIO 1.7 12/09/2020    GLOB 2.8 12/09/2020        Lab Results   Component Value Date    CHOL 238 (H) 12/09/2020    CHOL 192 09/15/2015     Lab Results   Component Value Date    TRIG 65 12/09/2020    TRIG 65 09/15/2015     Lab Results   Component Value Date    HDL 78 (H) 12/09/2020    HDL 80 (A) 09/15/2015     Lab Results   Component Value Date    LDLCALC 147 (H) 12/09/2020    LDLCALC 99 09/15/2015     Lab Results   Component Value Date    LABVLDL 13 12/09/2020    VLDL 13 09/15/2015     Lab Results   Component Value Date    CHOLHDLRATIO 2.4 09/15/2015      TSH   Date Value Ref Range Status   12/09/2020 1.51 0.27 - 4.20 uIU/mL Final   06/09/2016 1.36 0.27 - 4.20 uIU/mL Final   09/15/2015 1.270 uIU/mL Final         Outpatient Medications Marked as Taking for the 12/27/21 encounter (Office Visit) with Kesha Vasquez MD   Medication Sig Dispense Refill    busPIRone (BUSPAR) 5 MG tablet TAKE 1 TABLET TWICE A  tablet 2    desvenlafaxine succinate (PRISTIQ) 100 MG TB24 extended release tablet TAKE 1 TABLET DAILY 90 tablet 2    valsartan (DIOVAN) 160 MG tablet Take 1 tablet by mouth daily 90 tablet 1    tiZANidine (ZANAFLEX) 4 MG tablet Take 0.5-1 tablets by mouth nightly 60 tablet 5    PARAGARD INTRAUTERINE COPPER IU by Intrauterine route          Allergies   Allergen Reactions    Latex Rash    Vicodin [Hydrocodone-Acetaminophen] Itching and Rash       Patient Active Problem List   Diagnosis    Allergic rhinitis    Eczema    Insulin resistance    Recurrent major depressive disorder, in partial remission (HCC)    Psychophysiological insomnia    JEAN (generalized anxiety disorder)    H/O anorexia nervosa    H/O bulimia nervosa    Nightmares    Migraine without aura and without status migrainosus, not intractable    Hypermobility syndrome    Eczema, dyshidrotic    Attention deficit disorder (ADD) without hyperactivity    Essential hypertension       Past Medical History:   Diagnosis Date    Anxiety     Attention deficit disorder (ADD) without hyperactivity 1/3/2021    Concussion with no loss of consciousness 10/3/2018    Depression     Elevated AST (SGOT) 9/30/2015    Essential hypertension 6/14/2021    Iliopsoas bursitis 9/1/2016    Intractable acute post-traumatic headache 10/3/2018    Neurologic cardiac syncope     Thoracic myofascial strain 8/25/2016       Past Surgical History:   Procedure Laterality Date    BREAST ENHANCEMENT SURGERY      with reconstructive    HIP OSTEOTOMY Right 01/08/2020        Family History   Problem Relation Age of Onset    Diabetes Father     High Blood Pressure Maternal Grandmother     Heart Disease Maternal Grandmother     High Blood Pressure Maternal Grandfather 45    Diabetes Paternal Grandmother     Diabetes Paternal Grandfather     Asthma Mother        Social History     Tobacco Use    Smoking status: Never Smoker    Smokeless tobacco: Never Used   Substance Use Topics    Alcohol use: Yes     Comment: occ    Drug use: No            Review of Systems  Review of Systems    Objective:   Physical Exam  Vitals:    12/27/21 1402 12/27/21 1447   BP: 120/86 130/82   Pulse: 87    Resp: 12    Temp: 97.9 °F (36.6 °C)    TempSrc: Temporal    SpO2: 100%    Weight: 183 lb 9.6 oz (83.3 kg)    Height: 5' 8.5\" (1.74 m)      Wt Readings from Last 3 Encounters:   12/27/21 183 lb 9.6 oz (83.3 kg)   12/09/20 167 lb (75.8 kg)   08/18/20 160 lb (72.6 kg)        Physical Exam    NAD    Skin is warm and dry. No rash. Well hydrated  Alert and oriented x 3. Mood and affect are normal.  The neck is supple and free of adenopathy or masses, the thyroid is normal without enlargement or nodules. Chest: clear with no wheezes or rales.   No retractions, or use of accessory muscles noted. Cardiovascular: PMI is not displaced, and no thrill noted. Regular rate and rhythm with no rub, murmur or gallop. No peripheral edema. No carotid bruits. The abdomen is soft without tenderness, guarding, mass, rebound or organomegaly. Aorta, femoral, DP and PT pulses intact. + tinel's right elbow; no effusion or deformity. Motor 5/5 flex and ext elbow and wrist, , add fingers and opposition thumb and forefinger. No atrophy. Assessment:       Diagnosis Orders   1. Attention deficit disorder (ADD) without hyperactivity  lisdexamfetamine (VYVANSE) 70 MG capsule    amphetamine-dextroamphetamine (ADDERALL, 10MG,) 10 MG tablet  Add adderall for extended coverage. Use of Vyvanse for binge eating addressed        3. Essential hypertension  Not quite at goal < 130/80  Continue Valsartan. Treat apnea and readdresse   4. Well adult exam  CBC    Comprehensive Metabolic Panel    TSH with Reflex    Lipid Panel    Hepatitis C Antibody   5. Chronic pain of right hip  tiZANidine (ZANAFLEX) 4 MG tablet   6. Obstructive sleep apnea syndrome  External Referral To Sleep Medicine  Importance of tx addressed; may help weight. 7. Ulnar neuritis, right  Avoid pressure on elbow          Plan:      Side effects of current medications reviewed and questions answered. Follow up in 3 months or prn.

## 2021-12-27 ENCOUNTER — OFFICE VISIT (OUTPATIENT)
Dept: FAMILY MEDICINE CLINIC | Age: 36
End: 2021-12-27
Payer: COMMERCIAL

## 2021-12-27 VITALS
SYSTOLIC BLOOD PRESSURE: 130 MMHG | OXYGEN SATURATION: 100 % | WEIGHT: 183.6 LBS | BODY MASS INDEX: 27.19 KG/M2 | HEART RATE: 87 BPM | RESPIRATION RATE: 12 BRPM | HEIGHT: 69 IN | DIASTOLIC BLOOD PRESSURE: 82 MMHG | TEMPERATURE: 97.9 F

## 2021-12-27 DIAGNOSIS — G56.21 ULNAR NEURITIS, RIGHT: ICD-10-CM

## 2021-12-27 DIAGNOSIS — F98.8 ATTENTION DEFICIT DISORDER (ADD) WITHOUT HYPERACTIVITY: Primary | ICD-10-CM

## 2021-12-27 DIAGNOSIS — I10 ESSENTIAL HYPERTENSION: ICD-10-CM

## 2021-12-27 DIAGNOSIS — Z00.00 WELL ADULT EXAM: ICD-10-CM

## 2021-12-27 DIAGNOSIS — M25.551 CHRONIC PAIN OF RIGHT HIP: ICD-10-CM

## 2021-12-27 DIAGNOSIS — G89.29 CHRONIC PAIN OF RIGHT HIP: ICD-10-CM

## 2021-12-27 DIAGNOSIS — G47.33 OBSTRUCTIVE SLEEP APNEA SYNDROME: ICD-10-CM

## 2021-12-27 PROCEDURE — 99214 OFFICE O/P EST MOD 30 MIN: CPT | Performed by: FAMILY MEDICINE

## 2021-12-27 RX ORDER — TIZANIDINE 4 MG/1
2-4 TABLET ORAL NIGHTLY
Qty: 60 TABLET | Refills: 5 | Status: SHIPPED | OUTPATIENT
Start: 2021-12-27

## 2021-12-27 RX ORDER — DEXTROAMPHETAMINE SACCHARATE, AMPHETAMINE ASPARTATE, DEXTROAMPHETAMINE SULFATE AND AMPHETAMINE SULFATE 2.5; 2.5; 2.5; 2.5 MG/1; MG/1; MG/1; MG/1
10-20 TABLET ORAL DAILY
Qty: 180 TABLET | Refills: 0 | Status: SHIPPED | OUTPATIENT
Start: 2021-12-27 | End: 2022-05-22 | Stop reason: SDUPTHER

## 2021-12-27 RX ORDER — BUSPIRONE HYDROCHLORIDE 5 MG/1
TABLET ORAL
Qty: 180 TABLET | Refills: 2 | Status: CANCELLED | OUTPATIENT
Start: 2021-12-27

## 2022-01-11 ENCOUNTER — PATIENT MESSAGE (OUTPATIENT)
Dept: FAMILY MEDICINE CLINIC | Age: 37
End: 2022-01-11

## 2022-01-11 DIAGNOSIS — E56.9 VITAMIN DEFICIENCY: Primary | ICD-10-CM

## 2022-01-11 NOTE — TELEPHONE ENCOUNTER
Pended orders below for Ferritin and vitamin D       From: Melanie Hines  To: Dr. Yamileth Meza: 1/11/2022 12:28 PM EST  Subject: Additional Darrick Sanders,    thought I sent this but I guess it didn't go through. My nutritionist wants to add a Vitamin D and Iron with Ferritin to my labs if you're willing to update the lab work? If not its okay, but I've had vit d deficiency in the past and she was curious. I guess that can contribute to weight gain. Thank you!

## 2022-01-13 DIAGNOSIS — E55.9 VITAMIN D DEFICIENCY: Primary | ICD-10-CM

## 2022-01-13 DIAGNOSIS — Z00.00 WELL ADULT EXAM: ICD-10-CM

## 2022-01-13 DIAGNOSIS — E56.9 VITAMIN DEFICIENCY: ICD-10-CM

## 2022-01-13 LAB
A/G RATIO: 2 (ref 1.1–2.2)
ALBUMIN SERPL-MCNC: 4.8 G/DL (ref 3.4–5)
ALP BLD-CCNC: 61 U/L (ref 40–129)
ALT SERPL-CCNC: 13 U/L (ref 10–40)
ANION GAP SERPL CALCULATED.3IONS-SCNC: 14 MMOL/L (ref 3–16)
AST SERPL-CCNC: 17 U/L (ref 15–37)
BILIRUB SERPL-MCNC: 0.7 MG/DL (ref 0–1)
BUN BLDV-MCNC: 13 MG/DL (ref 7–20)
CALCIUM SERPL-MCNC: 9.5 MG/DL (ref 8.3–10.6)
CHLORIDE BLD-SCNC: 100 MMOL/L (ref 99–110)
CHOLESTEROL, TOTAL: 239 MG/DL (ref 0–199)
CO2: 24 MMOL/L (ref 21–32)
CREAT SERPL-MCNC: 0.7 MG/DL (ref 0.6–1.1)
FERRITIN: 107.7 NG/ML (ref 15–150)
GFR AFRICAN AMERICAN: >60
GFR NON-AFRICAN AMERICAN: >60
GLUCOSE BLD-MCNC: 77 MG/DL (ref 70–99)
HCT VFR BLD CALC: 42.2 % (ref 36–48)
HDLC SERPL-MCNC: 76 MG/DL (ref 40–60)
HEMOGLOBIN: 14.5 G/DL (ref 12–16)
HEPATITIS C ANTIBODY INTERPRETATION: NORMAL
IRON % SATURATION: 47 % (ref 15–50)
IRON: 174 UG/DL (ref 37–145)
LDL CHOLESTEROL CALCULATED: 146 MG/DL
MCH RBC QN AUTO: 31.7 PG (ref 26–34)
MCHC RBC AUTO-ENTMCNC: 34.5 G/DL (ref 31–36)
MCV RBC AUTO: 92.1 FL (ref 80–100)
PDW BLD-RTO: 13.4 % (ref 12.4–15.4)
PLATELET # BLD: 180 K/UL (ref 135–450)
PMV BLD AUTO: 7.7 FL (ref 5–10.5)
POTASSIUM SERPL-SCNC: 4.5 MMOL/L (ref 3.5–5.1)
RBC # BLD: 4.58 M/UL (ref 4–5.2)
SODIUM BLD-SCNC: 138 MMOL/L (ref 136–145)
TOTAL IRON BINDING CAPACITY: 367 UG/DL (ref 260–445)
TOTAL PROTEIN: 7.2 G/DL (ref 6.4–8.2)
TRIGL SERPL-MCNC: 85 MG/DL (ref 0–150)
TSH REFLEX: 1.65 UIU/ML (ref 0.27–4.2)
VITAMIN D 25-HYDROXY: 17.5 NG/ML
VLDLC SERPL CALC-MCNC: 17 MG/DL
WBC # BLD: 5 K/UL (ref 4–11)

## 2022-01-13 RX ORDER — CHOLECALCIFEROL (VITAMIN D3) 125 MCG
1 CAPSULE ORAL DAILY
Qty: 30 TABLET | Status: SHIPPED | COMMUNITY
Start: 2022-01-13

## 2022-02-04 ENCOUNTER — PATIENT MESSAGE (OUTPATIENT)
Dept: FAMILY MEDICINE CLINIC | Age: 37
End: 2022-02-04

## 2022-02-04 ENCOUNTER — VIRTUAL VISIT (OUTPATIENT)
Dept: FAMILY MEDICINE CLINIC | Age: 37
End: 2022-02-04
Payer: COMMERCIAL

## 2022-02-04 DIAGNOSIS — B96.89 ACUTE BACTERIAL SINUSITIS: Primary | ICD-10-CM

## 2022-02-04 DIAGNOSIS — J01.90 ACUTE BACTERIAL SINUSITIS: Primary | ICD-10-CM

## 2022-02-04 PROCEDURE — 99213 OFFICE O/P EST LOW 20 MIN: CPT | Performed by: FAMILY MEDICINE

## 2022-02-04 RX ORDER — AMOXICILLIN AND CLAVULANATE POTASSIUM 875; 125 MG/1; MG/1
1 TABLET, FILM COATED ORAL 2 TIMES DAILY
Qty: 20 TABLET | Refills: 0 | Status: SHIPPED | OUTPATIENT
Start: 2022-02-04 | End: 2022-02-14

## 2022-02-04 RX ORDER — BENZONATATE 200 MG/1
200 CAPSULE ORAL 3 TIMES DAILY PRN
Qty: 30 CAPSULE | Refills: 0 | Status: SHIPPED | OUTPATIENT
Start: 2022-02-04 | End: 2022-02-14

## 2022-02-04 ASSESSMENT — PATIENT HEALTH QUESTIONNAIRE - PHQ9
5. POOR APPETITE OR OVEREATING: 0
SUM OF ALL RESPONSES TO PHQ9 QUESTIONS 1 & 2: 0
6. FEELING BAD ABOUT YOURSELF - OR THAT YOU ARE A FAILURE OR HAVE LET YOURSELF OR YOUR FAMILY DOWN: 0
1. LITTLE INTEREST OR PLEASURE IN DOING THINGS: 0
3. TROUBLE FALLING OR STAYING ASLEEP: 0
7. TROUBLE CONCENTRATING ON THINGS, SUCH AS READING THE NEWSPAPER OR WATCHING TELEVISION: 0
10. IF YOU CHECKED OFF ANY PROBLEMS, HOW DIFFICULT HAVE THESE PROBLEMS MADE IT FOR YOU TO DO YOUR WORK, TAKE CARE OF THINGS AT HOME, OR GET ALONG WITH OTHER PEOPLE: 0
SUM OF ALL RESPONSES TO PHQ QUESTIONS 1-9: 0
4. FEELING TIRED OR HAVING LITTLE ENERGY: 0
SUM OF ALL RESPONSES TO PHQ QUESTIONS 1-9: 0
SUM OF ALL RESPONSES TO PHQ QUESTIONS 1-9: 0
8. MOVING OR SPEAKING SO SLOWLY THAT OTHER PEOPLE COULD HAVE NOTICED. OR THE OPPOSITE, BEING SO FIGETY OR RESTLESS THAT YOU HAVE BEEN MOVING AROUND A LOT MORE THAN USUAL: 0
9. THOUGHTS THAT YOU WOULD BE BETTER OFF DEAD, OR OF HURTING YOURSELF: 0
SUM OF ALL RESPONSES TO PHQ QUESTIONS 1-9: 0
2. FEELING DOWN, DEPRESSED OR HOPELESS: 0

## 2022-02-04 NOTE — PROGRESS NOTES
2022    TELEHEALTH EVALUATION -- Audio/Visual (During CKXKN-95 public health emergency)    HPI:    Jorge Craig (:  1985) has requested an audio/video evaluation for the following concern(s):    URI    Swelling in the left face below the eye and in the right inner cheek. Had COVID about 10 days ago; was feeling better until 2 days ago. Facial swelling started today.  + ST, facial/check pain paloma left. Feels feverish.  + cough, dry. Chest aches but no dyspnea. Yellow nasal discharge. . No nausea, vomiting.  + malaise. Not light headed. Denies disturbed vision, ear pain. Rx: cold and flu med not helping much. Review of Systems    Outpatient Medications Marked as Taking for the 22 encounter (Virtual Visit) with Az Flores MD   Medication Sig Dispense Refill    valsartan (DIOVAN) 160 MG tablet Take 1 tablet by mouth daily 90 tablet 3    Cholecalciferol (VITAMIN D3) 50 MCG ( UT) TABS Take 1 tablet by mouth daily 30 tablet     tiZANidine (ZANAFLEX) 4 MG tablet Take 0.5-1 tablets by mouth nightly 60 tablet 5    lisdexamfetamine (VYVANSE) 70 MG capsule Take 1 capsule by mouth every morning for 90 days.  90 capsule 0    busPIRone (BUSPAR) 5 MG tablet TAKE 1 TABLET TWICE A  tablet 2    desvenlafaxine succinate (PRISTIQ) 100 MG TB24 extended release tablet TAKE 1 TABLET DAILY 90 tablet 2    PARAGARD INTRAUTERINE COPPER IU by Intrauterine route          Social History     Tobacco Use    Smoking status: Never Smoker    Smokeless tobacco: Never Used   Substance Use Topics    Alcohol use: Yes     Comment: occ    Drug use: No        Allergies   Allergen Reactions    Latex Rash    Vicodin [Hydrocodone-Acetaminophen] Itching and Rash   ,   Past Medical History:   Diagnosis Date    Anxiety     Attention deficit disorder (ADD) without hyperactivity 1/3/2021    Concussion with no loss of consciousness 10/3/2018    Depression     Elevated AST (SGOT) 2015    Essential hypertension 6/14/2021    Iliopsoas bursitis 9/1/2016    Intractable acute post-traumatic headache 10/3/2018    Neurologic cardiac syncope     Thoracic myofascial strain 8/25/2016       PHYSICAL EXAMINATION:  [ INSTRUCTIONS:  \"[x]\" Indicates a positive item  \"[]\" Indicates a negative item  -- DELETE ALL ITEMS NOT EXAMINED]  Vital Signs: (As obtained by patient/caregiver or practitioner observation)    Blood pressure-  Heart rate-    Respiratory rate-    Temperature-  Pulse oximetry-   Wt Readings from Last 3 Encounters:   12/27/21 183 lb 9.6 oz (83.3 kg)   12/09/20 167 lb (75.8 kg)   08/18/20 160 lb (72.6 kg)     Temp Readings from Last 3 Encounters:   12/27/21 97.9 °F (36.6 °C) (Temporal)   12/09/20 98 °F (36.7 °C) (Temporal)   08/18/20 97.9 °F (36.6 °C) (Temporal)     BP Readings from Last 3 Encounters:   12/27/21 130/82   12/09/20 125/86   08/18/20 131/88     Pulse Readings from Last 3 Encounters:   12/27/21 87   12/09/20 91   08/18/20 116      Constitutional: [x] Appears well-developed and well-nourished [] No apparent distress      [x] Abnormal-  Mildly ill appearing  Mental status  [x] Alert and awake  [x] Oriented to person/place/time [x]Able to follow commands      Eyes:  EOM    [x]  Normal  [] Abnormal-  Sclera  [x]  Normal  [] Abnormal -         Discharge [x]  None visible  [] Abnormal -    HENT:   [x] Normocephalic, atraumatic. [] Abnormal       External Ears [x] Normal  [] Abnormal-     Neck: [x] No visualized mass     Pulmonary/Chest: [x] Respiratory effort normal.  [x] No visualized signs of difficulty breathing or respiratory distress        [] Abnormal-          Neurological:        [x] No Facial Asymmetry (Cranial nerve 7 motor function) (limited exam to video visit)          [x] No gaze palsy        [] Abnormal-         Skin:        [] No significant exanthematous lesions or discoloration noted on facial skin         [x] Abnormal-  Moderate swelling left lower lid and cheek.   No erythema or induration           Psychiatric:       [x] Normal Affect [x] No Hallucinations        [] Abnormal-     Other pertinent observable physical exam findings-     ASSESSMENT/PLAN:  1. Acute bacterial sinusitis  Advised to call asap or to ER if visual disturbance, induration around eye, worsening fever/malaise or if not better in 48 to 72 hours. - amoxicillin-clavulanate (AUGMENTIN) 875-125 MG per tablet; Take 1 tablet by mouth 2 times daily for 10 days  Dispense: 20 tablet; Refill: 0  - benzonatate (TESSALON) 200 MG capsule; Take 1 capsule by mouth 3 times daily as needed for Cough  Dispense: 30 capsule; Refill: 0    Side effects of current medications reviewed and questions answered. No follow-ups on file. Matilda Olszewski, was evaluated through a synchronous (real-time) audio-video encounter. The patient (or guardian if applicable) is aware that this is a billable service, which includes applicable co-pays. This Virtual Visit was conducted with patient's (and/or legal guardian's) consent. The visit was conducted pursuant to the emergency declaration under the [address] [place] [address], [address] [place] [address] [facility] authority and the [employer] General Act. Patient identification was verified, and a caregiver was present when appropriate. The patient was located at home in a state where the provider was licensed to provide care. Total time spent on this encounter: Not billed by time    --Magdiel Neely MD on 2/4/2022 at 12:59 PM    An electronic signature was used to authenticate this note.

## 2022-02-04 NOTE — TELEPHONE ENCOUNTER
From: Gabe Rehman  To: Dr. Yovana Antoine: 2/4/2022 11:16 AM EST  Subject: No one is answering, need guidance    Hi Dr. Marlys Falcon,     No one is answering anywhere and obviously I wouldn't expect them to in this weather. I had covid a couple of weeks ago and tested negative on the 27th. I went to the dentist on the 1st. On the second, later at night I started to feel really congested. I woke up yesterday pretty sick. My face is VERY swollen as is the inside of my mouth. I can't get a hold of them but I'm trying as well. I took another covid test and it was negative to be sure the first wasn't wrong- but I felt a lot better so I'm pretty sure it wasn't. I am willing to pay a copay if I can just get some meds or even if you want to facetime me for five minutes to look at this.     Thank Leonora Forrester

## 2022-02-07 RX ORDER — METHYLPREDNISOLONE 4 MG/1
TABLET ORAL
Qty: 1 KIT | Refills: 0 | Status: SHIPPED | OUTPATIENT
Start: 2022-02-07

## 2022-05-22 ENCOUNTER — E-VISIT (OUTPATIENT)
Dept: FAMILY MEDICINE CLINIC | Age: 37
End: 2022-05-22
Payer: COMMERCIAL

## 2022-05-22 DIAGNOSIS — F98.8 ATTENTION DEFICIT DISORDER (ADD) WITHOUT HYPERACTIVITY: ICD-10-CM

## 2022-05-22 PROCEDURE — 99421 OL DIG E/M SVC 5-10 MIN: CPT | Performed by: FAMILY MEDICINE

## 2022-05-22 RX ORDER — DEXTROAMPHETAMINE SACCHARATE, AMPHETAMINE ASPARTATE, DEXTROAMPHETAMINE SULFATE AND AMPHETAMINE SULFATE 2.5; 2.5; 2.5; 2.5 MG/1; MG/1; MG/1; MG/1
10-20 TABLET ORAL DAILY
Qty: 180 TABLET | Refills: 0 | Status: SHIPPED | OUTPATIENT
Start: 2022-05-22 | End: 2022-10-27

## 2022-05-22 NOTE — PROGRESS NOTES
Patient Active Problem List   Diagnosis    Allergic rhinitis    Eczema    Insulin resistance    Recurrent major depressive disorder, in partial remission ([facility] [address]    Psychophysiological insomnia    JEAN (generalized anxiety disorder)    H/O anorexia nervosa    H/O bulimia nervosa    Nightmares    Migraine without aura and without status migrainosus, not intractable    Hypermobility syndrome    Eczema, dyshidrotic    Attention deficit disorder (ADD) without hyperactivity    Essential hypertension    Vitamin D deficiency      Past Medical History:   Diagnosis Date    Anxiety     Attention deficit disorder (ADD) without hyperactivity 1/3/2021    Concussion with no loss of consciousness 10/3/2018    Depression     Elevated AST (SGOT) 9/30/2015    Essential hypertension 6/14/2021    Iliopsoas bursitis 9/1/2016    Intractable acute post-traumatic headache 10/3/2018    Neurologic cardiac syncope     Thoracic myofascial strain 8/25/2016     Social History     Tobacco Use    Smoking status: Never Smoker    Smokeless tobacco: Never Used   Substance Use Topics    Alcohol use: Yes     Comment: occ    Drug use: No      Allergies   Allergen Reactions    Latex Rash    Vicodin [Hydrocodone-Acetaminophen] Itching and Rash      Current Outpatient Medications on File Prior to Visit   Medication Sig Dispense Refill    methylPREDNISolone (MEDROL, JERSON,) 4 MG tablet Take as directed on package insert 1 kit 0    valsartan (DIOVAN) 160 MG tablet Take 1 tablet by mouth daily 90 tablet 3    Cholecalciferol (VITAMIN D3) 50 MCG (2000 UT) TABS Take 1 tablet by mouth daily 30 tablet     tiZANidine (ZANAFLEX) 4 MG tablet Take 0.5-1 tablets by mouth nightly 60 tablet 5    lisdexamfetamine (VYVANSE) 70 MG capsule Take 1 capsule by mouth every morning for 90 days. 90 capsule 0    amphetamine-dextroamphetamine (ADDERALL, 10MG,) 10 MG tablet Take 1-2 tablets by mouth daily for 30 days.  180 tablet 0    busPIRone (BUSPAR) 5 MG tablet TAKE 1 TABLET TWICE A  tablet 2    desvenlafaxine succinate (PRISTIQ) 100 MG TB24 extended release tablet TAKE 1 TABLET DAILY 90 tablet 2    PARAGARD INTRAUTERINE COPPER IU by Intrauterine route       No current facility-administered medications on file prior to visit. PDMP reviewed and no concerns noted. Diagnosis Orders   1. Attention deficit disorder (ADD) without hyperactivity  amphetamine-dextroamphetamine (ADDERALL, 10MG,) 10 MG tablet    lisdexamfetamine (VYVANSE) 70 MG capsule      5-10 minutes were spent on the digital evaluation and management of this patient.

## 2022-08-07 DIAGNOSIS — F33.41 RECURRENT MAJOR DEPRESSIVE DISORDER, IN PARTIAL REMISSION (HCC): ICD-10-CM

## 2022-08-08 RX ORDER — DESVENLAFAXINE 100 MG/1
TABLET, EXTENDED RELEASE ORAL
Qty: 90 TABLET | Refills: 2 | Status: SHIPPED | OUTPATIENT
Start: 2022-08-08

## 2022-08-08 NOTE — TELEPHONE ENCOUNTER
Requested Prescriptions     Pending Prescriptions Disp Refills    desvenlafaxine succinate (PRISTIQ) 100 MG TB24 extended release tablet [Pharmacy Med Name: DESVENLAF(P) TAB 100MG ER] 90 tablet 2     Sig: TAKE 1 TABLET DAILY     Last OV; 5/22/2022  Last labs; 1/13/2022  No F/U

## 2022-08-13 DIAGNOSIS — F33.41 RECURRENT MAJOR DEPRESSIVE DISORDER, IN PARTIAL REMISSION (HCC): ICD-10-CM

## 2022-08-15 RX ORDER — BUSPIRONE HYDROCHLORIDE 5 MG/1
TABLET ORAL
Qty: 180 TABLET | Refills: 2 | Status: SHIPPED | OUTPATIENT
Start: 2022-08-15

## 2022-10-20 ENCOUNTER — E-VISIT (OUTPATIENT)
Dept: FAMILY MEDICINE CLINIC | Age: 37
End: 2022-10-20

## 2022-10-27 ENCOUNTER — TELEMEDICINE (OUTPATIENT)
Dept: FAMILY MEDICINE CLINIC | Age: 37
End: 2022-10-27
Payer: COMMERCIAL

## 2022-10-27 DIAGNOSIS — F33.41 RECURRENT MAJOR DEPRESSIVE DISORDER, IN PARTIAL REMISSION (HCC): ICD-10-CM

## 2022-10-27 DIAGNOSIS — I10 ESSENTIAL HYPERTENSION: ICD-10-CM

## 2022-10-27 DIAGNOSIS — F98.8 ATTENTION DEFICIT DISORDER (ADD) WITHOUT HYPERACTIVITY: Primary | ICD-10-CM

## 2022-10-27 DIAGNOSIS — G47.10 HYPERSOMNOLENCE: ICD-10-CM

## 2022-10-27 PROCEDURE — 99214 OFFICE O/P EST MOD 30 MIN: CPT | Performed by: FAMILY MEDICINE

## 2022-10-27 RX ORDER — DEXTROAMPHETAMINE SACCHARATE, AMPHETAMINE ASPARTATE MONOHYDRATE, DEXTROAMPHETAMINE SULFATE AND AMPHETAMINE SULFATE 7.5; 7.5; 7.5; 7.5 MG/1; MG/1; MG/1; MG/1
30 CAPSULE, EXTENDED RELEASE ORAL 2 TIMES DAILY
Qty: 60 CAPSULE | Refills: 0 | Status: SHIPPED | OUTPATIENT
Start: 2022-10-27 | End: 2022-11-26

## 2022-10-27 RX ORDER — DEXTROAMPHETAMINE SACCHARATE, AMPHETAMINE ASPARTATE, DEXTROAMPHETAMINE SULFATE AND AMPHETAMINE SULFATE 2.5; 2.5; 2.5; 2.5 MG/1; MG/1; MG/1; MG/1
10-20 TABLET ORAL DAILY
Qty: 180 TABLET | Refills: 0 | Status: CANCELLED | OUTPATIENT
Start: 2022-10-27 | End: 2023-01-25

## 2022-10-27 RX ORDER — DESVENLAFAXINE 50 MG/1
150 TABLET, EXTENDED RELEASE ORAL DAILY
Qty: 90 TABLET | Refills: 2 | Status: SHIPPED | OUTPATIENT
Start: 2022-10-27 | End: 2022-11-03 | Stop reason: SDUPTHER

## 2022-10-27 SDOH — ECONOMIC STABILITY: FOOD INSECURITY: WITHIN THE PAST 12 MONTHS, THE FOOD YOU BOUGHT JUST DIDN'T LAST AND YOU DIDN'T HAVE MONEY TO GET MORE.: NEVER TRUE

## 2022-10-27 SDOH — ECONOMIC STABILITY: FOOD INSECURITY: WITHIN THE PAST 12 MONTHS, YOU WORRIED THAT YOUR FOOD WOULD RUN OUT BEFORE YOU GOT MONEY TO BUY MORE.: NEVER TRUE

## 2022-10-27 ASSESSMENT — PATIENT HEALTH QUESTIONNAIRE - PHQ9
2. FEELING DOWN, DEPRESSED OR HOPELESS: 0
4. FEELING TIRED OR HAVING LITTLE ENERGY: 0
SUM OF ALL RESPONSES TO PHQ QUESTIONS 1-9: 0
3. TROUBLE FALLING OR STAYING ASLEEP: 0
1. LITTLE INTEREST OR PLEASURE IN DOING THINGS: 0
10. IF YOU CHECKED OFF ANY PROBLEMS, HOW DIFFICULT HAVE THESE PROBLEMS MADE IT FOR YOU TO DO YOUR WORK, TAKE CARE OF THINGS AT HOME, OR GET ALONG WITH OTHER PEOPLE: 0
SUM OF ALL RESPONSES TO PHQ QUESTIONS 1-9: 0
SUM OF ALL RESPONSES TO PHQ QUESTIONS 1-9: 0
8. MOVING OR SPEAKING SO SLOWLY THAT OTHER PEOPLE COULD HAVE NOTICED. OR THE OPPOSITE, BEING SO FIGETY OR RESTLESS THAT YOU HAVE BEEN MOVING AROUND A LOT MORE THAN USUAL: 0
9. THOUGHTS THAT YOU WOULD BE BETTER OFF DEAD, OR OF HURTING YOURSELF: 0
SUM OF ALL RESPONSES TO PHQ9 QUESTIONS 1 & 2: 0
5. POOR APPETITE OR OVEREATING: 0
SUM OF ALL RESPONSES TO PHQ QUESTIONS 1-9: 0
7. TROUBLE CONCENTRATING ON THINGS, SUCH AS READING THE NEWSPAPER OR WATCHING TELEVISION: 0
6. FEELING BAD ABOUT YOURSELF - OR THAT YOU ARE A FAILURE OR HAVE LET YOURSELF OR YOUR FAMILY DOWN: 0

## 2022-10-27 ASSESSMENT — SOCIAL DETERMINANTS OF HEALTH (SDOH): HOW HARD IS IT FOR YOU TO PAY FOR THE VERY BASICS LIKE FOOD, HOUSING, MEDICAL CARE, AND HEATING?: NOT HARD AT ALL

## 2022-10-27 NOTE — PROGRESS NOTES
10/27/2022    TELEHEALTH EVALUATION -- Audio/Visual (During KXOV- public health emergency)      HPI:     Génesis Bermudez (:  1985) has requested an audio/video evaluation for the following concern(s):    The primary encounter diagnosis was Attention deficit disorder (ADD) without hyperactivity. A diagnosis of Essential hypertension was also pertinent to this visit. ADD/ADHD:  Current treatment: Adderall- 10 MG and Vyvanse- 70 MG, which has been somewhat effective. Has trouble staying awake during the day. Has had sleep studies done. Mild HORACIO. Was told to loose weight. Sleeps 8 hours. Could not confirm narcolepsy. Exercises 3 times a week. Is working 75 hours a week. Wakes up feeling tired, her whole life. Vyvanse helps some. Does not fall asleep in the middle of a conversation. Vyvanse helps with binge eating. Residual symptoms: sleepiness. Medication side effects: None. Patient denies anorexia, involuntary weight loss, palpitations, insomnia, and irritability. EDS - sees Dr. Gale Kay for pain. Is on Pristiq for this. Wonders if Cymbalta would help more. Neurontin was not tolerating. Hypertension: Patient here for follow-up of elevated blood pressure. She is exercising and is adherent to low salt diet. Blood pressure is well controlled at home. Cardiac symptoms none. Cardiovascular risk factors: hypertension. Use of agents associated with hypertension: amphetamines. History of target organ damage: none.     Lab Results   Component Value Date/Time     2022 10:44 AM    K 4.5 2022 10:44 AM     2022 10:44 AM    CO2 24 2022 10:44 AM    BUN 13 2022 10:44 AM    CREATININE 0.7 2022 10:44 AM    GLUCOSE 77 2022 10:44 AM    CALCIUM 9.5 2022 10:44 AM       Review of Systems    Outpatient Medications Marked as Taking for the 10/27/22 encounter (Telemedicine) with Sirena Neri MD   Medication Sig Dispense Refill busPIRone (BUSPAR) 5 MG tablet TAKE 1 TABLET TWICE A  tablet 2    desvenlafaxine succinate (PRISTIQ) 100 MG TB24 extended release tablet TAKE 1 TABLET DAILY 90 tablet 2    amphetamine-dextroamphetamine (ADDERALL, 10MG,) 10 MG tablet Take 1-2 tablets by mouth daily for 90 days. 180 tablet 0    lisdexamfetamine (VYVANSE) 70 MG capsule Take 1 capsule by mouth every morning for 90 days.  90 capsule 0    valsartan (DIOVAN) 160 MG tablet Take 1 tablet by mouth daily 90 tablet 3    Cholecalciferol (VITAMIN D3) 50 MCG (2000 UT) TABS Take 1 tablet by mouth daily 30 tablet     tiZANidine (ZANAFLEX) 4 MG tablet Take 0.5-1 tablets by mouth nightly 60 tablet 5    PARAGARD INTRAUTERINE COPPER IU by Intrauterine route          Social History     Tobacco Use    Smoking status: Never    Smokeless tobacco: Never   Substance Use Topics    Alcohol use: Yes     Comment: occ    Drug use: No        Allergies   Allergen Reactions    Latex Rash    Vicodin [Hydrocodone-Acetaminophen] Itching and Rash   ,   Past Medical History:   Diagnosis Date    Anxiety     Attention deficit disorder (ADD) without hyperactivity 1/3/2021    Concussion with no loss of consciousness 10/3/2018    Depression     Elevated AST (SGOT) 9/30/2015    Essential hypertension 6/14/2021    Iliopsoas bursitis 9/1/2016    Intractable acute post-traumatic headache 10/3/2018    Neurologic cardiac syncope     Thoracic myofascial strain 8/25/2016       PHYSICAL EXAMINATION:  [ INSTRUCTIONS:  \"[x]\" Indicates a positive item  \"[]\" Indicates a negative item  -- DELETE ALL ITEMS NOT EXAMINED]  Vital Signs: (As obtained by patient/caregiver or practitioner observation)    Blood pressure- 125/70 Heart rate- 80   Respiratory rate-    Temperature-  Pulse oximetry-  165 lbs   Wt Readings from Last 3 Encounters:   12/27/21 183 lb 9.6 oz (83.3 kg)   12/09/20 167 lb (75.8 kg)   08/18/20 160 lb (72.6 kg)     Temp Readings from Last 3 Encounters:   12/27/21 97.9 °F (36.6 °C) (Temporal)   12/09/20 98 °F (36.7 °C) (Temporal)   08/18/20 97.9 °F (36.6 °C) (Temporal)     BP Readings from Last 3 Encounters:   12/27/21 130/82   12/09/20 125/86   08/18/20 131/88     Pulse Readings from Last 3 Encounters:   12/27/21 87   12/09/20 91   08/18/20 116       Constitutional: [x] Appears well-developed and well-nourished [x] No apparent distress      [] Abnormal-   Mental status  [x] Alert and awake  [x] Oriented to person/place/time [x]Able to follow commands        Pulmonary/Chest: [x] Respiratory effort normal.  [x] No visualized signs of difficulty breathing or respiratory distress        [] Abnormal-                Skin:        [x] No significant exanthematous lesions or discoloration noted on facial skin         [] Abnormal-            Psychiatric:       [x] Normal Affect [x] No Hallucinations        [] Abnormal-     Other pertinent observable physical exam findings-     ASSESSMENT/PLAN:  1. Attention deficit disorder (ADD) without hyperactivity  Try Adderall instead to see if hypersomnolence improves. I suspect she will need high dose. Discussed max reccommended daily dose is 40 mg  - amphetamine-dextroamphetamine (ADDERALL XR) 30 MG extended release capsule; Take 1 capsule by mouth in the morning and at bedtime for 30 days. Dispense: 60 capsule; Refill: 0    2. Essential hypertension  Well controlled. Continue same meds    3. Hypersomnolence  Refer to  Sleep for opinion. Try Adderall. 4.  Depression and chronic pain. Increase Pristiq. Side effects of current medications reviewed and questions answered. Follow up in 4 weeks or prn. No follow-ups on file. Darrick Hilario, was evaluated through a synchronous (real-time) audio-video encounter. The patient (or guardian if applicable) is aware that this is a billable service, which includes applicable co-pays. This Virtual Visit was conducted with patient's (and/or legal guardian's) consent.  The visit was conducted pursuant to the emergency declaration under the [facility], [address] [place] [address] authority and the [employer] and [employer] Act. Patient identification was verified, and a caregiver was present when appropriate. The patient was located at Home: [address] [place] [address]   [address] [facility] [address]. Provider was located at Home ([place] [address]): [place]. Total time spent on this encounter: Not billed by time    --Siddhartha Velazquez MD on 10/27/2022 at 7:54 AM    An electronic signature was used to authenticate this note.

## 2022-10-28 ENCOUNTER — TELEPHONE (OUTPATIENT)
Dept: ORTHOPEDIC SURGERY | Age: 37
End: 2022-10-28

## 2022-10-28 NOTE — TELEPHONE ENCOUNTER
Submitted PA for Amphetamine-Dextroamphet ER 30MG er capsules  Via CMM Key: [id number] STATUS: PENDING

## 2022-10-31 NOTE — TELEPHONE ENCOUNTER
Received an APPROVAL for Amphetamine-Dextroamphet ER 30MG er capsules 10/28/2022 12:00:00 AM, Coverage Ends on: 10/28/2023. Letter attached. If this requires a response please respond to the pool ( [facility] [address] [facility]). Thank you please advise patient.

## 2022-11-03 DIAGNOSIS — F33.41 RECURRENT MAJOR DEPRESSIVE DISORDER, IN PARTIAL REMISSION (HCC): ICD-10-CM

## 2022-11-03 RX ORDER — DESVENLAFAXINE 50 MG/1
50 TABLET, EXTENDED RELEASE ORAL DAILY
Qty: 90 TABLET | Refills: 1 | Status: SHIPPED | OUTPATIENT
Start: 2022-11-03

## 2022-11-03 RX ORDER — DESVENLAFAXINE 100 MG/1
100 TABLET, EXTENDED RELEASE ORAL DAILY
Qty: 90 TABLET | Refills: 1 | Status: SHIPPED | OUTPATIENT
Start: 2022-11-03

## 2022-11-03 NOTE — TELEPHONE ENCOUNTER
Received a DENIAL for Desvenlafaxine Succinate ER 50MG er tablets. Letter attached. If this requires a response please respond to the pool ( [facility] [address]). Thank you please advise patient.

## 2022-11-03 NOTE — TELEPHONE ENCOUNTER
Tell [employer] will not pay for 90 tab 50 mg s of the Desvenlafaxine a month; I will have to order Desvenlafaxine 100 mg + Devenlafaxine 50 mg to get the 150 mg daily dose. Ordered.  Thanks

## 2022-12-08 ENCOUNTER — E-VISIT (OUTPATIENT)
Dept: FAMILY MEDICINE CLINIC | Age: 37
End: 2022-12-08
Payer: COMMERCIAL

## 2022-12-08 DIAGNOSIS — F98.8 ATTENTION DEFICIT DISORDER (ADD) WITHOUT HYPERACTIVITY: ICD-10-CM

## 2022-12-08 PROCEDURE — 99421 OL DIG E/M SVC 5-10 MIN: CPT | Performed by: FAMILY MEDICINE

## 2022-12-08 RX ORDER — DEXTROAMPHETAMINE SACCHARATE, AMPHETAMINE ASPARTATE MONOHYDRATE, DEXTROAMPHETAMINE SULFATE AND AMPHETAMINE SULFATE 7.5; 7.5; 7.5; 7.5 MG/1; MG/1; MG/1; MG/1
30 CAPSULE, EXTENDED RELEASE ORAL 2 TIMES DAILY
Qty: 60 CAPSULE | Refills: 0 | Status: SHIPPED | OUTPATIENT
Start: 2023-02-06 | End: 2023-03-08

## 2022-12-08 RX ORDER — DEXTROAMPHETAMINE SACCHARATE, AMPHETAMINE ASPARTATE MONOHYDRATE, DEXTROAMPHETAMINE SULFATE AND AMPHETAMINE SULFATE 7.5; 7.5; 7.5; 7.5 MG/1; MG/1; MG/1; MG/1
30 CAPSULE, EXTENDED RELEASE ORAL 2 TIMES DAILY
Qty: 60 CAPSULE | Refills: 0 | Status: SHIPPED | OUTPATIENT
Start: 2023-01-07 | End: 2023-02-06

## 2022-12-08 RX ORDER — DEXTROAMPHETAMINE SACCHARATE, AMPHETAMINE ASPARTATE MONOHYDRATE, DEXTROAMPHETAMINE SULFATE AND AMPHETAMINE SULFATE 7.5; 7.5; 7.5; 7.5 MG/1; MG/1; MG/1; MG/1
30 CAPSULE, EXTENDED RELEASE ORAL 2 TIMES DAILY
Qty: 60 CAPSULE | Refills: 0 | Status: SHIPPED | OUTPATIENT
Start: 2022-12-08 | End: 2023-01-07

## 2022-12-08 NOTE — PROGRESS NOTES
Patient Active Problem List   Diagnosis    Allergic rhinitis    Eczema    Insulin resistance    Recurrent major depressive disorder, in partial remission ([address]    Psychophysiological insomnia    JEAN (generalized anxiety disorder)    H/O anorexia nervosa    H/O bulimia nervosa    Nightmares    Migraine without aura and without status migrainosus, not intractable    Hypermobility syndrome    Eczema, dyshidrotic    Attention deficit disorder (ADD) without hyperactivity    Essential hypertension    Vitamin D deficiency      Past Medical History:   Diagnosis Date    Anxiety     Attention deficit disorder (ADD) without hyperactivity 1/3/2021    Concussion with no loss of consciousness 10/3/2018    Depression     Elevated AST (SGOT) 9/30/2015    Essential hypertension 6/14/2021    Iliopsoas bursitis 9/1/2016    Intractable acute post-traumatic headache 10/3/2018    Neurologic cardiac syncope     Thoracic myofascial strain 8/25/2016     Social History     Tobacco Use    Smoking status: Never    Smokeless tobacco: Never   Substance Use Topics    Alcohol use: Yes     Comment: occ    Drug use: No      Allergies   Allergen Reactions    Latex Rash    Vicodin [Hydrocodone-Acetaminophen] Itching and Rash      Current Outpatient Medications on File Prior to Visit   Medication Sig Dispense Refill    desvenlafaxine succinate (PRISTIQ) 50 MG TB24 extended release tablet Take 1 tablet by mouth daily 90 tablet 1    desvenlafaxine succinate (PRISTIQ) 100 MG TB24 extended release tablet Take 1 tablet by mouth daily Take with Desvenlafaxine 50 mg for a total of 150 mg qd 90 tablet 1    amphetamine-dextroamphetamine (ADDERALL XR) 30 MG extended release capsule Take 1 capsule by mouth in the morning and at bedtime for 30 days.  60 capsule 0    busPIRone (BUSPAR) 5 MG tablet TAKE 1 TABLET TWICE A  tablet 2    desvenlafaxine succinate (PRISTIQ) 100 MG TB24 extended release tablet TAKE 1 TABLET DAILY 90 tablet 2 amphetamine-dextroamphetamine (ADDERALL, 10MG,) 10 MG tablet Take 1-2 tablets by mouth daily for 90 days. 180 tablet 0    lisdexamfetamine (VYVANSE) 70 MG capsule Take 1 capsule by mouth every morning for 90 days. 90 capsule 0    methylPREDNISolone (MEDROL, JERSON,) 4 MG tablet Take as directed on package insert (Patient not taking: Reported on 10/27/2022) 1 kit 0    valsartan (DIOVAN) 160 MG tablet Take 1 tablet by mouth daily 90 tablet 3    Cholecalciferol (VITAMIN D3) 50 MCG (2000 UT) TABS Take 1 tablet by mouth daily 30 tablet     tiZANidine (ZANAFLEX) 4 MG tablet Take 0.5-1 tablets by mouth nightly 60 tablet 5    PARAGARD INTRAUTERINE COPPER IU by Intrauterine route       No current facility-administered medications on file prior to visit. PDMP reviewed and no concerns noted. Diagnosis Orders   1. Attention deficit disorder (ADD) without hyperactivity  amphetamine-dextroamphetamine (ADDERALL XR) 30 MG extended release capsule    amphetamine-dextroamphetamine (ADDERALL XR) 30 MG extended release capsule    amphetamine-dextroamphetamine (ADDERALL XR) 30 MG extended release capsule         5-10 minutes were spent on the digital evaluation and management of this patient.

## 2023-01-27 DIAGNOSIS — I10 ESSENTIAL HYPERTENSION: ICD-10-CM

## 2023-01-27 DIAGNOSIS — E55.9 VITAMIN D DEFICIENCY: ICD-10-CM

## 2023-01-27 DIAGNOSIS — Z00.00 WELL ADULT EXAM: Primary | ICD-10-CM

## 2023-01-27 RX ORDER — VALSARTAN 160 MG/1
TABLET ORAL
Qty: 90 TABLET | Refills: 0 | Status: SHIPPED | OUTPATIENT
Start: 2023-01-27

## 2023-01-27 NOTE — TELEPHONE ENCOUNTER
Requested Prescriptions     Pending Prescriptions Disp Refills    valsartan (DIOVAN) 160 MG tablet [Pharmacy Med Name: VALSARTAN TAB 160MG] 90 tablet 3     Sig: TAKE 1 TABLET DAILY         Last OV; 12/8/2022   Last labs; 1/13/2022  No F/u

## 2023-02-09 RX ORDER — DEXTROAMPHETAMINE SACCHARATE, AMPHETAMINE ASPARTATE MONOHYDRATE, DEXTROAMPHETAMINE SULFATE AND AMPHETAMINE SULFATE 7.5; 7.5; 7.5; 7.5 MG/1; MG/1; MG/1; MG/1
30 CAPSULE, EXTENDED RELEASE ORAL 2 TIMES DAILY
Qty: 60 CAPSULE | Refills: 0 | Status: CANCELLED | OUTPATIENT
Start: 2023-05-07 | End: 2023-06-06

## 2023-02-09 RX ORDER — DEXTROAMPHETAMINE SACCHARATE, AMPHETAMINE ASPARTATE MONOHYDRATE, DEXTROAMPHETAMINE SULFATE AND AMPHETAMINE SULFATE 7.5; 7.5; 7.5; 7.5 MG/1; MG/1; MG/1; MG/1
30 CAPSULE, EXTENDED RELEASE ORAL 2 TIMES DAILY
Qty: 60 CAPSULE | Refills: 0 | Status: CANCELLED | OUTPATIENT
Start: 2023-03-08 | End: 2023-04-07

## 2023-02-09 RX ORDER — DEXTROAMPHETAMINE SACCHARATE, AMPHETAMINE ASPARTATE MONOHYDRATE, DEXTROAMPHETAMINE SULFATE AND AMPHETAMINE SULFATE 7.5; 7.5; 7.5; 7.5 MG/1; MG/1; MG/1; MG/1
30 CAPSULE, EXTENDED RELEASE ORAL 2 TIMES DAILY
Qty: 60 CAPSULE | Refills: 0 | Status: CANCELLED | OUTPATIENT
Start: 2023-04-07 | End: 2023-05-07

## 2023-02-10 ENCOUNTER — OFFICE VISIT (OUTPATIENT)
Dept: FAMILY MEDICINE CLINIC | Age: 38
End: 2023-02-10

## 2023-02-10 VITALS
HEART RATE: 74 BPM | RESPIRATION RATE: 12 BRPM | OXYGEN SATURATION: 98 % | HEIGHT: 68 IN | WEIGHT: 174 LBS | DIASTOLIC BLOOD PRESSURE: 76 MMHG | SYSTOLIC BLOOD PRESSURE: 122 MMHG | TEMPERATURE: 97.3 F | BODY MASS INDEX: 26.37 KG/M2

## 2023-02-10 DIAGNOSIS — E55.9 VITAMIN D DEFICIENCY: ICD-10-CM

## 2023-02-10 DIAGNOSIS — F33.41 RECURRENT MAJOR DEPRESSIVE DISORDER, IN PARTIAL REMISSION (HCC): Primary | ICD-10-CM

## 2023-02-10 DIAGNOSIS — F98.8 ATTENTION DEFICIT DISORDER (ADD) WITHOUT HYPERACTIVITY: ICD-10-CM

## 2023-02-10 DIAGNOSIS — N92.6 IRREGULAR MENSES: ICD-10-CM

## 2023-02-10 DIAGNOSIS — Z00.00 WELL ADULT EXAM: ICD-10-CM

## 2023-02-10 DIAGNOSIS — I10 ESSENTIAL HYPERTENSION: ICD-10-CM

## 2023-02-10 DIAGNOSIS — R40.0 DAYTIME SLEEPINESS: ICD-10-CM

## 2023-02-10 RX ORDER — METFORMIN HYDROCHLORIDE 500 MG/1
1000 TABLET, EXTENDED RELEASE ORAL
Qty: 180 TABLET | Refills: 1 | Status: SHIPPED | OUTPATIENT
Start: 2023-02-10

## 2023-02-10 SDOH — ECONOMIC STABILITY: INCOME INSECURITY: HOW HARD IS IT FOR YOU TO PAY FOR THE VERY BASICS LIKE FOOD, HOUSING, MEDICAL CARE, AND HEATING?: NOT HARD AT ALL

## 2023-02-10 SDOH — ECONOMIC STABILITY: HOUSING INSECURITY
IN THE LAST 12 MONTHS, WAS THERE A TIME WHEN YOU DID NOT HAVE A STEADY PLACE TO SLEEP OR SLEPT IN A SHELTER (INCLUDING NOW)?: NO

## 2023-02-10 SDOH — ECONOMIC STABILITY: FOOD INSECURITY: WITHIN THE PAST 12 MONTHS, THE FOOD YOU BOUGHT JUST DIDN'T LAST AND YOU DIDN'T HAVE MONEY TO GET MORE.: NEVER TRUE

## 2023-02-10 SDOH — ECONOMIC STABILITY: FOOD INSECURITY: WITHIN THE PAST 12 MONTHS, YOU WORRIED THAT YOUR FOOD WOULD RUN OUT BEFORE YOU GOT MONEY TO BUY MORE.: NEVER TRUE

## 2023-02-10 ASSESSMENT — PATIENT HEALTH QUESTIONNAIRE - PHQ9
SUM OF ALL RESPONSES TO PHQ9 QUESTIONS 1 & 2: 0
1. LITTLE INTEREST OR PLEASURE IN DOING THINGS: 0
3. TROUBLE FALLING OR STAYING ASLEEP: 0
SUM OF ALL RESPONSES TO PHQ QUESTIONS 1-9: 0
SUM OF ALL RESPONSES TO PHQ QUESTIONS 1-9: 0
8. MOVING OR SPEAKING SO SLOWLY THAT OTHER PEOPLE COULD HAVE NOTICED. OR THE OPPOSITE, BEING SO FIGETY OR RESTLESS THAT YOU HAVE BEEN MOVING AROUND A LOT MORE THAN USUAL: 0
4. FEELING TIRED OR HAVING LITTLE ENERGY: 0
7. TROUBLE CONCENTRATING ON THINGS, SUCH AS READING THE NEWSPAPER OR WATCHING TELEVISION: 0
SUM OF ALL RESPONSES TO PHQ QUESTIONS 1-9: 0
5. POOR APPETITE OR OVEREATING: 0
9. THOUGHTS THAT YOU WOULD BE BETTER OFF DEAD, OR OF HURTING YOURSELF: 0
10. IF YOU CHECKED OFF ANY PROBLEMS, HOW DIFFICULT HAVE THESE PROBLEMS MADE IT FOR YOU TO DO YOUR WORK, TAKE CARE OF THINGS AT HOME, OR GET ALONG WITH OTHER PEOPLE: 0
2. FEELING DOWN, DEPRESSED OR HOPELESS: 0
SUM OF ALL RESPONSES TO PHQ QUESTIONS 1-9: 0
6. FEELING BAD ABOUT YOURSELF - OR THAT YOU ARE A FAILURE OR HAVE LET YOURSELF OR YOUR FAMILY DOWN: 0

## 2023-02-10 ASSESSMENT — ANXIETY QUESTIONNAIRES
7. FEELING AFRAID AS IF SOMETHING AWFUL MIGHT HAPPEN: 0
6. BECOMING EASILY ANNOYED OR IRRITABLE: 0
IF YOU CHECKED OFF ANY PROBLEMS ON THIS QUESTIONNAIRE, HOW DIFFICULT HAVE THESE PROBLEMS MADE IT FOR YOU TO DO YOUR WORK, TAKE CARE OF THINGS AT HOME, OR GET ALONG WITH OTHER PEOPLE: NOT DIFFICULT AT ALL
4. TROUBLE RELAXING: 0
1. FEELING NERVOUS, ANXIOUS, OR ON EDGE: 0
3. WORRYING TOO MUCH ABOUT DIFFERENT THINGS: 0
GAD7 TOTAL SCORE: 0
5. BEING SO RESTLESS THAT IT IS HARD TO SIT STILL: 0
2. NOT BEING ABLE TO STOP OR CONTROL WORRYING: 0

## 2023-02-10 NOTE — PROGRESS NOTES
Subjective:      Patient ID: Hugh Kuhn 40 y.o. female. The primary encounter diagnosis was Recurrent major depressive disorder, in partial remission ([address] Diagnoses of Essential hypertension, Attention deficit disorder (ADD) without hyperactivity, and Daytime sleepiness were also pertinent to this visit. HPI    ADD/ADHD/depression:  Current treatment: Adderall XR- 30 mg and Pristiq 150 mg, which has been effective. Residual symptoms: inattention. Medication side effects: None. Patient denies anorexia, palpitations, and irritability. She is always tired and always hungry. Wellbutrin helped some and Vyvanse helping binging. Binging in the PM. Adderall helps more with sleepiness. Would like to go back on he Vyvanse. She is stressed with work frustration more than depressed. Metformin helped her not to be hungry in the past.     Daytime sleepiness: had sleep study one year ago. Mild HORACIO. Weight loss was recommended to treat. She has lost 9 lb.s   she eats very well. She is going to need a THR. She takes oxycontin from pain management only if she cannot sleep. Pain management just started her on Lyrica at [facility]; has not started yet.         Lab Results   Component Value Date     01/13/2022    K 4.5 01/13/2022     01/13/2022    CO2 24 01/13/2022    BUN 13 01/13/2022    CREATININE 0.7 01/13/2022    GLUCOSE 77 01/13/2022    CALCIUM 9.5 01/13/2022    PROT 7.2 01/13/2022    LABALBU 4.8 01/13/2022    BILITOT 0.7 01/13/2022    ALKPHOS 61 01/13/2022    AST 17 01/13/2022    ALT 13 01/13/2022    LABGLOM >60 01/13/2022    GFRAA >60 01/13/2022    AGRATIO 2.0 01/13/2022    GLOB 2.8 12/09/2020        Lab Results   Component Value Date    WBC 5.0 01/13/2022    HGB 14.5 01/13/2022    HCT 42.2 01/13/2022    MCV 92.1 01/13/2022     01/13/2022     TSH   Date Value Ref Range Status   01/13/2022 1.65 0.27 - 4.20 uIU/mL Final   12/09/2020 1.51 0.27 - 4.20 uIU/mL Final   06/09/2016 1.36 0.27 - 4.20 uIU/mL Final   09/15/2015 1.270 uIU/mL Final     No results found for: [id number]    Outpatient Medications Marked as Taking for the 2/10/23 encounter (Office Visit) with Tiffanie Ahn MD   Medication Sig Dispense Refill    valsartan (DIOVAN) 160 MG tablet TAKE 1 TABLET DAILY 90 tablet 0    amphetamine-dextroamphetamine (ADDERALL XR) 30 MG extended release capsule Take 1 capsule by mouth 2 times daily for 30 days.  60 capsule 0    desvenlafaxine succinate (PRISTIQ) 50 MG TB24 extended release tablet Take 1 tablet by mouth daily 90 tablet 1    desvenlafaxine succinate (PRISTIQ) 100 MG TB24 extended release tablet Take 1 tablet by mouth daily Take with Desvenlafaxine 50 mg for a total of 150 mg qd 90 tablet 1    busPIRone (BUSPAR) 5 MG tablet TAKE 1 TABLET TWICE A  tablet 2    desvenlafaxine succinate (PRISTIQ) 100 MG TB24 extended release tablet TAKE 1 TABLET DAILY 90 tablet 2    Cholecalciferol (VITAMIN D3) 50 MCG (2000 UT) TABS Take 1 tablet by mouth daily 30 tablet     tiZANidine (ZANAFLEX) 4 MG tablet Take 0.5-1 tablets by mouth nightly 60 tablet 5    PARAGARD INTRAUTERINE COPPER IU by Intrauterine route          Allergies   Allergen Reactions    Latex Rash    Vicodin [Hydrocodone-Acetaminophen] Itching and Rash       Patient Active Problem List   Diagnosis    Allergic rhinitis    Eczema    Insulin resistance    Recurrent major depressive disorder, in partial remission (HCC)    Psychophysiological insomnia    JEAN (generalized anxiety disorder)    H/O anorexia nervosa    H/O bulimia nervosa    Nightmares    Migraine without aura and without status migrainosus, not intractable    Hypermobility syndrome    Eczema, dyshidrotic    Attention deficit disorder (ADD) without hyperactivity    Essential hypertension    Vitamin D deficiency       Past Medical History:   Diagnosis Date    Anxiety     Attention deficit disorder (ADD) without hyperactivity 1/3/2021    Concussion with no loss of consciousness 10/3/2018    Depression     Elevated AST (SGOT) 9/30/2015    Essential hypertension 6/14/2021    Iliopsoas bursitis 9/1/2016    Intractable acute post-traumatic headache 10/3/2018    Neurologic cardiac syncope     Thoracic myofascial strain 8/25/2016       Past Surgical History:   Procedure Laterality Date    BREAST ENHANCEMENT SURGERY      with reconstructive    HIP OSTEOTOMY Right 01/08/2020        Family History   Problem Relation Age of Onset    Diabetes Father     High Blood Pressure Maternal Grandmother     Heart Disease Maternal Grandmother     High Blood Pressure Maternal Grandfather 45    Diabetes Paternal Grandmother     Diabetes Paternal Grandfather     Asthma Mother        Social History     Tobacco Use    Smoking status: Never    Smokeless tobacco: Never   Substance Use Topics    Alcohol use: Yes     Comment: occ    Drug use: No            Review of Systems  Review of Systems    Objective:   Physical Exam  Vitals:    02/10/23 0923   BP: 122/76   Pulse: 74   Resp: 12   Temp: 97.3 °F (36.3 °C)   TempSrc: Temporal   SpO2: 98%   Weight: 174 lb (78.9 kg)   Height: 5' 8\" (1.727 m)     Wt Readings from Last 3 Encounters:   02/10/23 174 lb (78.9 kg)   12/27/21 183 lb 9.6 oz (83.3 kg)   12/09/20 167 lb (75.8 kg)        Physical Exam  NAD    Skin is warm and dry. No rash. Well hydrated  Alert and oriented x 3. Mood and affect are normal.  The neck is supple and free of adenopathy or masses, the thyroid is normal without enlargement or nodules. Chest: clear with no wheezes or rales. No retractions, or use of accessory muscles noted. Cardiovascular: PMI is not displaced, and no thrill noted. Regular rate and rhythm with no rub, murmur or gallop. No peripheral edema. No carotid bruits. Assessment:       Diagnosis Orders   1. Recurrent major depressive disorder, in partial remission ([facility]  Vitamin B12  Addressed      2. Essential hypertension  At goal < 130/80  Continue current rx      3.  Attention deficit disorder (ADD) without hyperactivity  lisdexamfetamine (VYVANSE) 70 MG capsule    lisdexamfetamine (VYVANSE) 70 MG capsule    lisdexamfetamine (VYVANSE) 70 MG capsule  PDMP reviewed and no concerns noted. 4. Daytime sleepiness  Vyvanse      5. Well adult exam  Comprehensive Metabolic Panel    TSH with Reflex    CBC with Auto Differential      6. Irregular menses  Insulin, Fasting    DHEA-Sulfate    Testosterone    metFORMIN (GLUCOPHAGE-XR) 500 MG extended release tablet    Hemoglobin A1C      7. Vitamin D deficiency  Vitamin D 25 Hydroxy             Plan:      Side effects of current medications reviewed and questions answered. Follow up in 3 months or prn.

## 2023-02-10 NOTE — LETTER
[address] [address]  Phone: [phone]  Fax: [phone]    Priyanka Moreno MD         February 10, 2023     Patient: Carmine Mcknight   YOB: 1985   Date of Visit: 2/10/2023       To Whom It May Concern: It is my medical opinion that Soham Kapadia requires a disability parking placard for the following reasons:  She has limited walking ability due to an arthritic condition. Duration of need: 5 years    If you have any questions or concerns, please don't hesitate to call.     Sincerely,        Priyanka Moreno MD

## 2023-02-16 DIAGNOSIS — E34.9 HYPOTESTOSTERONEMIA: Primary | ICD-10-CM

## 2023-02-21 DIAGNOSIS — R79.89 LOW TESTOSTERONE LEVEL IN FEMALE: Primary | ICD-10-CM

## 2023-02-26 ENCOUNTER — TELEPHONE (OUTPATIENT)
Dept: FAMILY MEDICINE CLINIC | Age: 38
End: 2023-02-26

## 2023-02-27 NOTE — TELEPHONE ENCOUNTER
Called pt went straight to VM left an message of the lab results and information of the Endocrinologist

## 2023-04-04 DIAGNOSIS — M25.551 CHRONIC PAIN OF RIGHT HIP: ICD-10-CM

## 2023-04-04 DIAGNOSIS — G89.29 CHRONIC PAIN OF RIGHT HIP: ICD-10-CM

## 2023-04-05 RX ORDER — TIZANIDINE 4 MG/1
TABLET ORAL
Qty: 30 TABLET | OUTPATIENT
Start: 2023-04-05

## 2023-05-04 DIAGNOSIS — F33.41 RECURRENT MAJOR DEPRESSIVE DISORDER, IN PARTIAL REMISSION (HCC): ICD-10-CM

## 2023-05-04 DIAGNOSIS — I10 ESSENTIAL HYPERTENSION: ICD-10-CM

## 2023-05-04 RX ORDER — DESVENLAFAXINE 100 MG/1
TABLET, EXTENDED RELEASE ORAL
Qty: 90 TABLET | Refills: 2 | OUTPATIENT
Start: 2023-05-04

## 2023-05-04 RX ORDER — BUSPIRONE HYDROCHLORIDE 5 MG/1
TABLET ORAL
Qty: 180 TABLET | Refills: 2 | Status: SHIPPED | OUTPATIENT
Start: 2023-05-04

## 2023-05-04 RX ORDER — VALSARTAN 160 MG/1
TABLET ORAL
Qty: 90 TABLET | Refills: 0 | OUTPATIENT
Start: 2023-05-04

## 2023-05-04 NOTE — TELEPHONE ENCOUNTER
Requested Prescriptions     Pending Prescriptions Disp Refills    busPIRone (BUSPAR) 5 MG tablet [Pharmacy Med Name: BUSPIRONE TAB 5MG] 180 tablet 2     Sig: TAKE 1 TABLET TWICE A DAY.           Last Office Visit: 2/10/2023     Next Office Visit: Visit date not found     Last Labs: 2/10/23

## 2023-05-10 DIAGNOSIS — G89.29 CHRONIC PAIN OF RIGHT HIP: ICD-10-CM

## 2023-05-10 DIAGNOSIS — F98.8 ATTENTION DEFICIT DISORDER (ADD) WITHOUT HYPERACTIVITY: ICD-10-CM

## 2023-05-10 DIAGNOSIS — F33.41 RECURRENT MAJOR DEPRESSIVE DISORDER, IN PARTIAL REMISSION (HCC): ICD-10-CM

## 2023-05-10 DIAGNOSIS — M25.551 CHRONIC PAIN OF RIGHT HIP: ICD-10-CM

## 2023-05-10 DIAGNOSIS — I10 ESSENTIAL HYPERTENSION: ICD-10-CM

## 2023-05-10 RX ORDER — VALSARTAN 160 MG/1
160 TABLET ORAL DAILY
Qty: 90 TABLET | Refills: 0 | OUTPATIENT
Start: 2023-05-10

## 2023-05-10 RX ORDER — DESVENLAFAXINE SUCCINATE 50 MG/1
50 TABLET, EXTENDED RELEASE ORAL DAILY
Qty: 90 TABLET | Refills: 1 | OUTPATIENT
Start: 2023-05-10

## 2023-05-10 RX ORDER — TIZANIDINE 4 MG/1
2-4 TABLET ORAL NIGHTLY
Qty: 60 TABLET | Refills: 5 | OUTPATIENT
Start: 2023-05-10

## 2023-05-22 DIAGNOSIS — I10 ESSENTIAL HYPERTENSION: ICD-10-CM

## 2023-05-22 DIAGNOSIS — F33.41 RECURRENT MAJOR DEPRESSIVE DISORDER, IN PARTIAL REMISSION (HCC): ICD-10-CM

## 2023-05-22 RX ORDER — DESVENLAFAXINE 100 MG/1
TABLET, EXTENDED RELEASE ORAL
Qty: 90 TABLET | Refills: 2 | OUTPATIENT
Start: 2023-05-22

## 2023-05-22 RX ORDER — VALSARTAN 160 MG/1
TABLET ORAL
Qty: 90 TABLET | Refills: 0 | OUTPATIENT
Start: 2023-05-22

## 2023-07-27 DIAGNOSIS — N92.6 IRREGULAR MENSES: ICD-10-CM

## 2023-07-27 RX ORDER — METFORMIN HYDROCHLORIDE 500 MG/1
TABLET, EXTENDED RELEASE ORAL
Qty: 180 TABLET | Refills: 1 | OUTPATIENT
Start: 2023-07-27